# Patient Record
Sex: FEMALE | Race: WHITE | NOT HISPANIC OR LATINO | Employment: UNEMPLOYED | URBAN - METROPOLITAN AREA
[De-identification: names, ages, dates, MRNs, and addresses within clinical notes are randomized per-mention and may not be internally consistent; named-entity substitution may affect disease eponyms.]

---

## 2019-02-23 ENCOUNTER — HOSPITAL ENCOUNTER (OUTPATIENT)
Dept: URGENT CARE | Facility: CLINIC | Age: 24
Discharge: HOME | End: 2019-02-23
Attending: NURSE PRACTITIONER

## 2019-02-25 LAB — BACTERIA SPEC AEROBE CULT: NORMAL

## 2021-05-11 ENCOUNTER — HOSPITAL ENCOUNTER (OUTPATIENT)
Dept: OTHER | Facility: HOSPITAL | Age: 26
Discharge: HOME | End: 2021-05-11
Attending: NURSE PRACTITIONER

## 2021-05-14 LAB
CONV LAST MENSTURAL PERIOD: NORMAL
SPECIMEN SOURCE: NORMAL
SPECIMEN SOURCE: NORMAL
THIN PREP CVX: NORMAL

## 2021-09-13 PROBLEM — J30.9 ALLERGIC RHINITIS: Status: ACTIVE | Noted: 2021-09-13

## 2021-09-13 PROBLEM — F41.9 ANXIETY: Status: ACTIVE | Noted: 2021-09-13

## 2021-09-13 PROBLEM — N20.0 KIDNEY STONE: Status: ACTIVE | Noted: 2021-09-13

## 2021-09-13 PROBLEM — E55.9 VITAMIN D DEFICIENCY: Status: ACTIVE | Noted: 2021-09-13

## 2021-09-20 ENCOUNTER — OFFICE VISIT (OUTPATIENT)
Dept: OBSTETRICS AND GYNECOLOGY | Facility: CLINIC | Age: 26
End: 2021-09-20

## 2021-09-20 VITALS
SYSTOLIC BLOOD PRESSURE: 128 MMHG | BODY MASS INDEX: 27.52 KG/M2 | HEART RATE: 98 BPM | WEIGHT: 161.2 LBS | HEIGHT: 64 IN | DIASTOLIC BLOOD PRESSURE: 90 MMHG

## 2021-09-20 DIAGNOSIS — Z30.8 ENCOUNTER FOR OTHER CONTRACEPTIVE MANAGEMENT: Primary | ICD-10-CM

## 2021-09-20 PROBLEM — K21.9 GASTROESOPHAGEAL REFLUX DISEASE: Status: ACTIVE | Noted: 2021-09-20

## 2021-09-20 PROCEDURE — 11982 REMOVE DRUG IMPLANT DEVICE: CPT | Performed by: NURSE PRACTITIONER

## 2021-09-20 RX ORDER — OMEPRAZOLE 20 MG/1
20 CAPSULE, DELAYED RELEASE ORAL DAILY
COMMUNITY
Start: 2021-07-28

## 2021-09-20 NOTE — PROGRESS NOTES
Patient wants to have nexplanon removed, has random back pains and hasn't felt like herself since she had it inserted.  Planning to use NFP/condoms for now.     Procedure: Nexplanon removal    Procedures    Pre Dx: 1) Nexplanon removal  Post Dx: 1) Nexplanon removal   The risks, benefits, and alternatives to removal of the device have been discussed with the patient at length. All of her questions are answered. She is aware of the need for alternative means of contraception if desired. Verbal informed consent is obtained.    Able to easily palpate the device in the  Left arm. Additional imaging was not required. The device feels freely mobile and easily accessible. She was placed in the examining table in a supine position with her arm flexed at the elbow and hand under her head. The skin over this site is prepped with Betadine. 2-3 mL of 1% lidocaine with epinephrine was injected.    Downward pressure was applied on the proximal end of the implant nearest the axilla, and a 2-3 mm incision was made in a longitudinal direction of the arm at the tip of the implant closest to the elbow. The implant was then pushed gently toward the incision until the tip was visible. The fibrous capsule was opened with blunt dissection using a hemostat. The implant was grasp with a hemostat and was easily removed intact. It measured a  full 4 cm in length.    Steri-strips were placed over incision site, followed by antibiotic ointment and a pressure dressing.     Tolerated well  No apparent complications  She was advised to call or return for complications such as fever, signs of infection, increased pain, or any other concerns.    Warning signs, limitations and expectations reviewed.     Delmar Bocanegra, APRN  09/20/2021

## 2022-01-24 ENCOUNTER — LAB (OUTPATIENT)
Dept: LAB | Facility: HOSPITAL | Age: 27
End: 2022-01-24

## 2022-01-24 ENCOUNTER — TRANSCRIBE ORDERS (OUTPATIENT)
Dept: LAB | Facility: HOSPITAL | Age: 27
End: 2022-01-24

## 2022-01-24 DIAGNOSIS — Z20.822 ENCOUNTER FOR LABORATORY TESTING FOR COVID-19 VIRUS: Primary | ICD-10-CM

## 2022-01-24 DIAGNOSIS — Z20.822 ENCOUNTER FOR LABORATORY TESTING FOR COVID-19 VIRUS: ICD-10-CM

## 2022-01-24 PROCEDURE — C9803 HOPD COVID-19 SPEC COLLECT: HCPCS

## 2022-01-24 PROCEDURE — U0004 COV-19 TEST NON-CDC HGH THRU: HCPCS

## 2022-01-25 LAB — SARS-COV-2 RNA PNL SPEC NAA+PROBE: DETECTED

## 2023-11-22 ENCOUNTER — INITIAL PRENATAL (OUTPATIENT)
Dept: OBSTETRICS AND GYNECOLOGY | Facility: CLINIC | Age: 28
End: 2023-11-22
Payer: COMMERCIAL

## 2023-11-22 ENCOUNTER — TELEPHONE (OUTPATIENT)
Dept: OBSTETRICS AND GYNECOLOGY | Facility: CLINIC | Age: 28
End: 2023-11-22
Payer: COMMERCIAL

## 2023-11-22 VITALS — WEIGHT: 166 LBS | SYSTOLIC BLOOD PRESSURE: 114 MMHG | BODY MASS INDEX: 28.49 KG/M2 | DIASTOLIC BLOOD PRESSURE: 82 MMHG

## 2023-11-22 DIAGNOSIS — Z34.81 ENCOUNTER FOR SUPERVISION OF OTHER NORMAL PREGNANCY IN FIRST TRIMESTER: ICD-10-CM

## 2023-11-22 DIAGNOSIS — O09.899 H/O PRETERM DELIVERY, CURRENTLY PREGNANT: ICD-10-CM

## 2023-11-22 DIAGNOSIS — F32.A ANXIETY AND DEPRESSION: ICD-10-CM

## 2023-11-22 DIAGNOSIS — O21.9 NAUSEA AND VOMITING DURING PREGNANCY: ICD-10-CM

## 2023-11-22 DIAGNOSIS — Z34.80 SUPERVISION OF OTHER NORMAL PREGNANCY, ANTEPARTUM: Primary | ICD-10-CM

## 2023-11-22 DIAGNOSIS — F41.9 ANXIETY AND DEPRESSION: ICD-10-CM

## 2023-11-22 PROBLEM — J30.9 ALLERGIC RHINITIS: Status: RESOLVED | Noted: 2021-09-13 | Resolved: 2023-11-22

## 2023-11-22 PROBLEM — E55.9 VITAMIN D DEFICIENCY: Status: RESOLVED | Noted: 2021-09-13 | Resolved: 2023-11-22

## 2023-11-22 PROBLEM — K21.9 GASTROESOPHAGEAL REFLUX DISEASE: Status: RESOLVED | Noted: 2021-09-20 | Resolved: 2023-11-22

## 2023-11-22 PROBLEM — N20.0 KIDNEY STONE: Status: RESOLVED | Noted: 2021-09-13 | Resolved: 2023-11-22

## 2023-11-22 LAB
ABO GROUP BLD: NORMAL
AMPHET+METHAMPHET UR QL: NEGATIVE
B-HCG UR QL: POSITIVE
BARBITURATES UR QL SCN: NEGATIVE
BASOPHILS # BLD AUTO: 0.03 10*3/MM3 (ref 0–0.2)
BASOPHILS NFR BLD AUTO: 0.3 % (ref 0–1.5)
BENZODIAZ UR QL SCN: NEGATIVE
BLD GP AB SCN SERPL QL: NEGATIVE
C TRACH RRNA CVX QL NAA+PROBE: NOT DETECTED
CANNABINOIDS SERPL QL: NEGATIVE
COCAINE UR QL: NEGATIVE
DEPRECATED RDW RBC AUTO: 36.5 FL (ref 37–54)
EOSINOPHIL # BLD AUTO: 0.21 10*3/MM3 (ref 0–0.4)
EOSINOPHIL NFR BLD AUTO: 1.9 % (ref 0.3–6.2)
ERYTHROCYTE [DISTWIDTH] IN BLOOD BY AUTOMATED COUNT: 11.6 % (ref 12.3–15.4)
EXPIRATION DATE: ABNORMAL
FENTANYL UR-MCNC: NEGATIVE NG/ML
GLUCOSE UR STRIP-MCNC: NEGATIVE MG/DL
HBA1C MFR BLD: 5.2 % (ref 4.8–5.6)
HBV SURFACE AG SERPL QL IA: NORMAL
HCT VFR BLD AUTO: 40.7 % (ref 34–46.6)
HCV AB SER DONR QL: NORMAL
HGB BLD-MCNC: 13.9 G/DL (ref 12–15.9)
HIV1+2 AB SER QL: NORMAL
IMM GRANULOCYTES # BLD AUTO: 0.03 10*3/MM3 (ref 0–0.05)
IMM GRANULOCYTES NFR BLD AUTO: 0.3 % (ref 0–0.5)
INTERNAL NEGATIVE CONTROL: ABNORMAL
INTERNAL POSITIVE CONTROL: ABNORMAL
LYMPHOCYTES # BLD AUTO: 2.28 10*3/MM3 (ref 0.7–3.1)
LYMPHOCYTES NFR BLD AUTO: 20.5 % (ref 19.6–45.3)
Lab: ABNORMAL
MCH RBC QN AUTO: 29.8 PG (ref 26.6–33)
MCHC RBC AUTO-ENTMCNC: 34.2 G/DL (ref 31.5–35.7)
MCV RBC AUTO: 87.2 FL (ref 79–97)
METHADONE UR QL SCN: NEGATIVE
MONOCYTES # BLD AUTO: 0.77 10*3/MM3 (ref 0.1–0.9)
MONOCYTES NFR BLD AUTO: 6.9 % (ref 5–12)
N GONORRHOEA RRNA SPEC QL NAA+PROBE: NOT DETECTED
NEUTROPHILS NFR BLD AUTO: 7.8 10*3/MM3 (ref 1.7–7)
NEUTROPHILS NFR BLD AUTO: 70.1 % (ref 42.7–76)
NRBC BLD AUTO-RTO: 0 /100 WBC (ref 0–0.2)
OPIATES UR QL: NEGATIVE
OXYCODONE UR QL SCN: NEGATIVE
PLATELET # BLD AUTO: 279 10*3/MM3 (ref 140–450)
PMV BLD AUTO: 11.1 FL (ref 6–12)
PROT UR STRIP-MCNC: ABNORMAL MG/DL
RBC # BLD AUTO: 4.67 10*6/MM3 (ref 3.77–5.28)
RH BLD: POSITIVE
T PALLIDUM IGG SER QL: NORMAL
WBC NRBC COR # BLD AUTO: 11.12 10*3/MM3 (ref 3.4–10.8)

## 2023-11-22 PROCEDURE — 86901 BLOOD TYPING SEROLOGIC RH(D): CPT | Performed by: OBSTETRICS & GYNECOLOGY

## 2023-11-22 PROCEDURE — G0432 EIA HIV-1/HIV-2 SCREEN: HCPCS | Performed by: OBSTETRICS & GYNECOLOGY

## 2023-11-22 PROCEDURE — 83036 HEMOGLOBIN GLYCOSYLATED A1C: CPT | Performed by: OBSTETRICS & GYNECOLOGY

## 2023-11-22 PROCEDURE — 87086 URINE CULTURE/COLONY COUNT: CPT | Performed by: OBSTETRICS & GYNECOLOGY

## 2023-11-22 PROCEDURE — 87591 N.GONORRHOEAE DNA AMP PROB: CPT | Performed by: OBSTETRICS & GYNECOLOGY

## 2023-11-22 PROCEDURE — 86780 TREPONEMA PALLIDUM: CPT | Performed by: OBSTETRICS & GYNECOLOGY

## 2023-11-22 PROCEDURE — 86803 HEPATITIS C AB TEST: CPT | Performed by: OBSTETRICS & GYNECOLOGY

## 2023-11-22 PROCEDURE — 80307 DRUG TEST PRSMV CHEM ANLYZR: CPT | Performed by: OBSTETRICS & GYNECOLOGY

## 2023-11-22 PROCEDURE — 85025 COMPLETE CBC W/AUTO DIFF WBC: CPT | Performed by: OBSTETRICS & GYNECOLOGY

## 2023-11-22 PROCEDURE — 87340 HEPATITIS B SURFACE AG IA: CPT | Performed by: OBSTETRICS & GYNECOLOGY

## 2023-11-22 PROCEDURE — 86850 RBC ANTIBODY SCREEN: CPT | Performed by: OBSTETRICS & GYNECOLOGY

## 2023-11-22 PROCEDURE — 87491 CHLMYD TRACH DNA AMP PROBE: CPT | Performed by: OBSTETRICS & GYNECOLOGY

## 2023-11-22 PROCEDURE — 86900 BLOOD TYPING SEROLOGIC ABO: CPT | Performed by: OBSTETRICS & GYNECOLOGY

## 2023-11-22 RX ORDER — PRENATAL VIT NO.126/IRON/FOLIC 28MG-0.8MG
1 TABLET ORAL DAILY
COMMUNITY

## 2023-11-22 RX ORDER — ESCITALOPRAM OXALATE 20 MG/1
20 TABLET ORAL DAILY
Qty: 30 TABLET | Refills: 6 | Status: SHIPPED | OUTPATIENT
Start: 2023-11-22

## 2023-11-22 RX ORDER — DIPHENHYDRAMINE HYDROCHLORIDE 25 MG/1
25 CAPSULE ORAL DAILY
Qty: 30 TABLET | Refills: 0 | Status: SHIPPED | OUTPATIENT
Start: 2023-11-22

## 2023-11-22 NOTE — ASSESSMENT & PLAN NOTE
G4-delivered at 35 weeks-spontaneous labor  Cervical length 16-24 weeks Q 2 weeks  Vaginal progesterone-undecided

## 2023-11-22 NOTE — PATIENT INSTRUCTIONS
Venipuncture Blood Specimen Collection  Venipuncture performed in left arm by Carmela Abdul with good hemostasis. Patient tolerated the procedure well without complications.   11/22/23   Carmela Abdul

## 2023-11-22 NOTE — ASSESSMENT & PLAN NOTE
BENJIE finalize:Estimated Date of Delivery: 24 based on LMP, confirmed with BSUS CRL at 9w3d      Genetic testing (NIPS-Quad)/CF/AFP:  ordered    COVID: Recommended  Flu: Recommended   Tdap:Script 27-36 weeks   RSV: Script 32-36 weeks     Rhogam:     Sterilization:    Anatomy US:ordered  FU US:    EPDS: 3 at new OB visit, 0 on #10  GTT:     PROBLEM LIST/PLAN:   Hx of  labor-35 weeks G4, recommend vaginal progesterone nightly at 16 weeks-36 weeks, cervical length 16-24 weeks   Anxiety and depression-Zoloft 25mg daily

## 2023-11-22 NOTE — PROGRESS NOTES
OBSTETRIC HISTORY AND PHYSICAL     Subjective:  Lillian Kebede is a 28 y.o.  at 10w2d  here for her new OB visit. Patient pregnancy is dated by LMP, confirmed BENJIE with BSUS CRL of 9w3d. Patient's pregnancy is complicated by hx of  delivery G4, anxiety and depression, multiparity.   She is taking her prenatal vitamins.Reports no loss of fluid or vaginal bleeding.No hx of genetic, bleeding, endocrine, chromosome disorder in both patient and partner. No history of multiple gestations, congenital anomalies or mental retardation.    FOB involvement: yes, Same Fob  Pediatrician: yes  Breast/Bottle: breast   Epidural: yes  Discussed adequate water intake, food guidelines/weight gain, limit caffiene to less than 200mg daily.   Discussed food, activities to avoid. Discussed seatbelt safety.   Reviewed safe meds in pregnancy handout.  Taking PNV: yes  Smoking cessation needed: no    Reviewed and updated:  OBHx, GYNHx (STDs), PMHx, Medications, Allergies, PSHx, Social Hx, Preventative Hx (PAP), Hx of abuse/safe environment, Vaccine Hx including hx of chickenpox or vaccine, Genetic Hx (pt, FOB, both families).        OB History    Para Term  AB Living   5 4 3 1   4   SAB IAB Ectopic Molar Multiple Live Births             4      # Outcome Date GA Lbr Teodoro/2nd Weight Sex Delivery Anes PTL Lv   5 Current            4  20 35w0d  2637 g (5 lb 13 oz) M Vag-Spont EPI Y PRESTON   3 Term 17 40w0d  2948 g (6 lb 8 oz) M Vag-Spont EPI N PRESTON   2 Term 03/10/16 40w0d  3657 g (8 lb 1 oz) M Vag-Spont EPI N PRESTON   1 Term 13 40w0d  2977 g (6 lb 9 oz) M Vag-Spont EPI N PRESTON      Obstetric Comments   MENARCHE 11 YRS OLD REGULAR CYCLES, LASTING 5 DAYS     Past Medical History:   Diagnosis Date    Acute vaginitis     Allergy     CEPHALEXIN   MODERATE ITCHNESS    Anxiety     Depression     Heartburn     Kidney stone     Nexplanon in place 2020    Oligohydramnios, unspecified trimester, fetus 1      Other abnormal findings in urine     Threatened      Vitamin D deficiency, unspecified      Past Surgical History:   Procedure Laterality Date    CYSTOSCOPY BLADDER STONE LITHOTRIPSY       Family History   Problem Relation Age of Onset    Breast cancer Mother     Thyroid disease Mother     Breast cancer Maternal Grandmother     Diabetes Other         mat aunt mat uncle    Diabetes Other         NOT SPECIFIED    Uterine cancer Neg Hx     Ovarian cancer Neg Hx     Colon cancer Neg Hx      Allergies   Allergen Reactions    Cephalexin Itching     Social History     Socioeconomic History    Marital status:    Tobacco Use    Smoking status: Never    Smokeless tobacco: Never   Vaping Use    Vaping Use: Never used   Substance and Sexual Activity    Alcohol use: Not Currently    Drug use: Never    Sexual activity: Yes     Partners: Male     Birth control/protection: None           ROS:  General ROS: negative for - chills or fatigue  Respiratory ROS: negative for - cough or hemoptysis  Cardiovascular ROS: negative for - chest pain or dyspnea on exertion  Gastrointestinal ROS: negative for - abdominal pain or appetite loss  Musculoskeletal ROS: negative for - gait disturbance or joint pain  Neurological ROS: negative for - behavioral changes or bowel and bladder control changes  Dermatological ROS: negative for rashes or lesions     Objective:  Physical Exam:   Vitals:    23 0930   BP: 114/82       General appearance - alert, well appearing, and in no distress  Mental status - alert, oriented to person, place, and time  Neck- Supple.  No nodularity or enlargement.  Heart- Regular rate and rhythm without murmur, gallop or rub.  Lungs- Clear to auscultation bilaterally, negative W/R/R  Breasts- Deferred to annual  Abdomen- Soft, Gravid uterus, non-tender  Extremeties: Normal ROM, Negative swelling or cyanosis  Neurological: Gait normal, Negative tingling or loss of sensation     FHTS: + BSUS      Counseling:   Nutrition discussed, calories, activity/exercise in pregnancy  Discussed dietary restrictions/safety food preparation in pregnancy  Reviewed what to expect prenatal visits, office providers (female and male) and covering [facility] Hospitalists/Dr. Maier  Appropriate trimester precautions provided, N/V, vag bleeding, cramping  VACCINE importance in pregnancy discussed.  Maternal and fetal risk of not being vaccinated reviewed NLT increased risk maternal/fetal severity of illness/death, PTD, CS, hemorrhage, HTN, possible IUFD.  Significant maternal and fetal/infant benefit w vaccination.  FDA approval and ACOG/SMFM/CDC strong recommendation in pregnancy.  Questions answered.   Questions answered  ANXIETY/DEPRESSION- We discussed treatment options R/B/A/SE/E expectant, THERAPY, SSRI, vs SSRI + Therapy.  Non medical options usually recommended first.  MARTI reviewed.  Ideally weaning in third tri if possible. Some studies indicate child w increased risk Dep/Anx w SSRI use in preg.  Black box warning.  Recommend avoid abrupt discontinuation.  Discussed healthy weight gain.  Also discussed increased water intake, 200mg of caffeine daily, exercise and healthy eating habits. *   Encouraged patient to consider breastfeeding. Discussed that it is recommended by the CDC and WHO that women exclusively breastfeed for the first 6 months and continue to breastfeed up to one year. Discussed the health benefits of breastfeeding, including increased immune systems in infants, reduced risk of food allergies, and reduced risk of chronic disease as an adult. Maternal benefits include more PP weight loss, reduced risk of PP depression, breast/ovarian cancer risk reduced.     ASSESSMENT/PLAN:   Diagnoses and all orders for this visit:    1. Supervision of other normal pregnancy, antepartum (Primary)  Assessment & Plan:  BENJIE finalize:Estimated Date of Delivery: 6/17/24 based on LMP, confirmed with BSUS CRL at 9w3d      Genetic  testing (NIPS-Quad)/CF/AFP:  ordered    COVID: Recommended  Flu: Recommended   Tdap:Script 27-36 weeks   RSV: Script 32-36 weeks     Rhogam:     Sterilization:    Anatomy US:ordered  FU US:    EPDS: 3 at new OB visit, 0 on #10  GTT:     PROBLEM LIST/PLAN:   Hx of  labor-35 weeks G4, recommend vaginal progesterone nightly at 16 weeks-36 weeks, cervical length 16-24 weeks   Anxiety and depression-Zoloft 25mg daily     Orders:  -     Chlamydia trachomatis, Neisseria gonorrhoeae, PCR - Urine, Urine, Random Void  -     Hemoglobin A1c  -     Hemoglobinopathy Fractionation [place]  -     OB Panel With HIV  -     POC Urinalysis Dipstick  -     POC Pregnancy, Urine  -     Urine Culture - Urine, Urine, Clean Catch  -     Urine Drug Screen - Urine, Clean Catch  -     US Ob 14 + Weeks Single or First Gestation; Future    2. Encounter for supervision of other normal pregnancy in first trimester  -     Inheritest (R) CF/SMA Panel - Blood,  -     [id number] PLUS Core+SCA+ESS - Blood,    3. H/O  delivery, currently pregnant  Assessment & Plan:  G4-delivered at 35 weeks-spontaneous labor  Cervical length 16-24 weeks Q 2 weeks  Vaginal progesterone-undecided    Orders:  -     US Ob Transvaginal; Standing    4. Nausea and vomiting during pregnancy  -     vitamin B-6 (PYRIDOXINE) 25 MG tablet; Take 1 tablet by mouth Daily.  Dispense: 30 tablet; Refill: 0  -     doxylamine (UNISOM) 25 MG tablet; Take 1 tablet by mouth Daily As Needed for Sleep or Nausea (or anxiety). May take and extra 1/2 tablet PRN persistent nausea or anxiety  Dispense: 30 tablet; Refill: 0    5. Anxiety and depression  Assessment & Plan:  Stable, Lexapro 20mg     Orders:  -     escitalopram (LEXAPRO) 20 MG tablet; Take 1 tablet by mouth Daily.  Dispense: 30 tablet; Refill: 6        Problems Addressed this Visit          Gravid and     H/O  delivery, currently pregnant     G4-delivered at 35 weeks-spontaneous labor  Cervical length 16-24  weeks Q 2 weeks  Vaginal progesterone-undecided         Relevant Orders    US Ob Transvaginal    Supervision of other normal pregnancy, antepartum - Primary     BENJIE finalize:Estimated Date of Delivery: 24 based on LMP, confirmed with BSUS CRL at 9w3d      Genetic testing (NIPS-Quad)/CF/AFP:  ordered    COVID: Recommended  Flu: Recommended   Tdap:Script 27-36 weeks   RSV: Script 32-36 weeks     Rhogam:     Sterilization:    Anatomy US:ordered  FU US:    EPDS: 3 at new OB visit, 0 on #10  GTT:     PROBLEM LIST/PLAN:   Hx of  labor-35 weeks G4, recommend vaginal progesterone nightly at 16 weeks-36 weeks, cervical length 16-24 weeks   Anxiety and depression-Zoloft 25mg daily          Relevant Orders    Chlamydia trachomatis, Neisseria gonorrhoeae, PCR - Urine, Urine, Random Void    Hemoglobin A1c    Hemoglobinopathy Fractionation [place]    OB Panel With HIV    POC Urinalysis Dipstick (Completed)    POC Pregnancy, Urine (Completed)    Urine Culture - Urine, Urine, Clean Catch    Urine Drug Screen - Urine, Clean Catch    US Ob 14 + Weeks Single or First Gestation       Mental Health    Anxiety and depression     Stable, Lexapro 20mg          Relevant Medications    doxylamine (UNISOM) 25 MG tablet    escitalopram (LEXAPRO) 20 MG tablet     Other Visit Diagnoses       Encounter for supervision of other normal pregnancy in first trimester        Relevant Orders    Inheritest (R) CF/SMA Panel - Blood,    [id number] PLUS Core+SCA+ESS - Blood,    Nausea and vomiting during pregnancy        Relevant Medications    vitamin B-6 (PYRIDOXINE) 25 MG tablet    doxylamine (UNISOM) 25 MG tablet          Diagnoses         Codes Comments    Supervision of other normal pregnancy, antepartum    -  Primary ICD-10-CM: Z34.80  ICD-9-CM: V22.1     Encounter for supervision of other normal pregnancy in first trimester     ICD-10-CM: Z34.81  ICD-9-CM: V22.1     H/O  delivery, currently pregnant     ICD-10-CM:  O09.899  ICD-9-CM: V23.89     Nausea and vomiting during pregnancy     ICD-10-CM: O21.9  ICD-9-CM: 643.90     Anxiety and depression     ICD-10-CM: F41.9, F32.A  ICD-9-CM: 300.00, 311                     Return in about 4 weeks (around 12/20/2023) for Routine OB visit.    We have gone over the expected prenatal care to include the timing and content of visits including the anatomy ultrasound.  We discussed the content of the anatomy ultrasound and its limitations (the fact that ultrasound in general may not see up to 40% of abnormalities).  I informed her how to contact the office and/or on call person in the event of any problems and encouraged her to do so when she feels it is necessary.  We then spent time answering her questions which she indicated were answered to her satisfaction.    Lillian Mcnulty DO  11/22/2023 10:04 EST

## 2023-11-23 LAB — RUBV IGG SERPL IA-ACNC: 1.93 INDEX

## 2023-11-24 LAB
BACTERIA SPEC AEROBE CULT: NO GROWTH
HGB A MFR BLD ELPH: 97.4 % (ref 96.4–98.8)
HGB A2 MFR BLD ELPH: 2.6 % (ref 1.8–3.2)
HGB F MFR BLD ELPH: 0 % (ref 0–2)
HGB FRACT BLD-IMP: NORMAL
HGB S MFR BLD ELPH: 0 %

## 2023-11-27 ENCOUNTER — PATIENT ROUNDING (BHMG ONLY) (OUTPATIENT)
Dept: OBSTETRICS AND GYNECOLOGY | Facility: CLINIC | Age: 28
End: 2023-11-27
Payer: COMMERCIAL

## 2023-11-27 NOTE — PROGRESS NOTES
A My-Chart message has been sent to the patient for [place] [facility] [place]

## 2023-12-04 LAB
CITATION REF LAB TEST: NORMAL
ETHNIC BACKGROUND STATED: NORMAL
GENE DIS ANL CARRIER INTERP-IMP: NORMAL
GENE STUDIED ID: NORMAL
LAB DIRECTOR NAME PROVIDER: NORMAL
Lab: NORMAL
MOL DX INTERP BLD/T QL: NORMAL
REASON FOR REFERRAL (NARRATIVE): NORMAL
RECOMMENDATION PATIENT DOC-IMP: NORMAL
REF LAB TEST METHOD: NORMAL
SERVICE CMNT-IMP: NORMAL
SPECIMEN SOURCE: NORMAL

## 2023-12-19 NOTE — PROGRESS NOTES
Routine Prenatal Visit     Subjective  Lillian Kebede is a 28 y.o.  at 14w2d here for her routine OB visit.   She is taking her prenatal vitamins.Reports no loss of fluid or vaginal bleeding. Patient doing well but does feel very anxious. EPDS today 13, denies any thoughts of harming herself or others. Was started on lexapro 20mg but has not started, she was counseled that it can worsen at first when starting to take it but if she has any of these thoughts of harming herself or others to go to the ED. She is unsure on taking the vaginal progesterone. She was counseled that I can prescribe it and if she does desire to take it she will start at 16 weeks with her cervical lengths.     Pregnancy is complicated by:    Patient Active Problem List   Diagnosis    Anxiety and depression    Supervision of other normal pregnancy, antepartum    H/O  delivery, currently pregnant         OB History    Para Term  AB Living   5 4 3 1   4   SAB IAB Ectopic Molar Multiple Live Births             4      # Outcome Date GA Lbr Teodoro/2nd Weight Sex Delivery Anes PTL Lv   5 Current            4  20 35w0d  2637 g (5 lb 13 oz) M Vag-Spont EPI Y PRESTON   3 Term 17 40w0d  2948 g (6 lb 8 oz) M Vag-Spont EPI N PRESTON   2 Term 03/10/16 40w0d  3657 g (8 lb 1 oz) M Vag-Spont EPI N PRESTON   1 Term 13 40w0d  2977 g (6 lb 9 oz) M Vag-Spont EPI N PRESTON      Obstetric Comments   MENARCHE 11 YRS OLD REGULAR CYCLES, LASTING 5 DAYS           ROS:   General ROS: negative for - chills or fatigue  Respiratory ROS: negative for - cough or hemoptysis  Cardiovascular ROS: negative for - chest pain or dyspnea on exertion  Genito-Urinary ROS: negative for  change in urinary stream, vaginal discharge   Musculoskeletal ROS: negative for - gait disturbance or joint pain  Dermatological ROS: negative for acne,  dry skin or itching    Objective  Physical Exam:   Vitals:    23 1031   BP: 117/78       FHT: 110-160  BPM via BSUS     General appearance - alert, well appearing, and in no distress  Mental status - alert, oriented to person, place, and time  Abdomen- Soft, Gravid uterus, non-tender to palpation  Musculoskeletal: negative for - gait disturbance or joint pain  Extremeties: negative swelling or cyanosis   Dermatological: negative rashes or skin lesions       Assessment/Plan:   Diagnoses and all orders for this visit:    1. Supervision of other normal pregnancy, antepartum (Primary)  Assessment & Plan:  PROBLEM LIST/PLAN:   Hx of  labor-35 weeks G4, recommend vaginal progesterone nightly at 16 weeks-36 weeks, cervical length 16-24 weeks   Anxiety and depression-Zoloft 25mg daily     Orders:  -     POC Urinalysis Dipstick    2. H/O  delivery, currently pregnant  -     Progesterone (Prometrium) 100 MG capsule; Insert 1 capsule into the vagina Every Night.  Dispense: 30 capsule; Refill: 7    3. Anxiety and depression          Counseling:   Second trimester precautions  Round Ligament Pain:  The uterus has several ligaments which provide support and keep the uterus in place. As the  uterus grows these ligaments are pulled and stretched which often causes sharp stabbing like pain in the inguinal area.   You may find a pregnancy support band helpful. Changing positions may also help. Yoga is a great way to cope with round ligament and low back pain in pregnancy.    Massage may also help with low back pain   Things to Consider at this Point in your Pregnancy:  Some women experience swelling in their feet during pregnancy. Compression stockings may help  Drink plenty of water and stay active   Make sure you are eating frequent small meals, nuts are a wonderful snack to keep with you            Return in about 4 weeks (around 2024) for Routine OB visit.      We have gone over prenatal care to include the timing and content of visits. I informed her how to contact the office and/or on call person in the event  of any problems and encouraged her to do so when she feels it is necessary.  We then spent time answering her questions which she indicated were answered to her satisfaction.    Lillian Mcnulty DO  12/20/2023 11:02 EST

## 2023-12-19 NOTE — ASSESSMENT & PLAN NOTE
PROBLEM LIST/PLAN:   Hx of  labor-35 weeks G4, recommend vaginal progesterone nightly at 16 weeks-36 weeks, cervical length 16-24 weeks   Anxiety and depression-Zoloft 25mg daily

## 2023-12-20 ENCOUNTER — ROUTINE PRENATAL (OUTPATIENT)
Dept: OBSTETRICS AND GYNECOLOGY | Facility: CLINIC | Age: 28
End: 2023-12-20
Payer: COMMERCIAL

## 2023-12-20 VITALS — SYSTOLIC BLOOD PRESSURE: 117 MMHG | BODY MASS INDEX: 28.15 KG/M2 | WEIGHT: 164 LBS | DIASTOLIC BLOOD PRESSURE: 78 MMHG

## 2023-12-20 DIAGNOSIS — Z34.80 SUPERVISION OF OTHER NORMAL PREGNANCY, ANTEPARTUM: Primary | ICD-10-CM

## 2023-12-20 DIAGNOSIS — F41.9 ANXIETY AND DEPRESSION: ICD-10-CM

## 2023-12-20 DIAGNOSIS — F32.A ANXIETY AND DEPRESSION: ICD-10-CM

## 2023-12-20 DIAGNOSIS — O09.899 H/O PRETERM DELIVERY, CURRENTLY PREGNANT: ICD-10-CM

## 2023-12-20 LAB
GLUCOSE UR STRIP-MCNC: NEGATIVE MG/DL
PROT UR STRIP-MCNC: NEGATIVE MG/DL

## 2023-12-20 RX ORDER — PROGESTERONE 100 MG/1
100 CAPSULE ORAL NIGHTLY
Qty: 30 CAPSULE | Refills: 7 | Status: SHIPPED | OUTPATIENT
Start: 2023-12-20

## 2024-01-04 PROBLEM — O44.40 LOW-LYING PLACENTA: Status: ACTIVE | Noted: 2024-01-04

## 2024-01-22 NOTE — PROGRESS NOTES
OB FOLLOW UP      Chief Complaint   Patient presents with    Routine Prenatal Visit     Subjective:   Feels out of breath for a couple weeks is assoc w upper abd pressure.  Comes mostly after eating after lunch and dinner.  Lasts 1hr.  No CP.  No hx of asthma.  Is constipated, no melana or hematochezia.  Goes away w pressing on it or lying on side.  Is having acid reflux, not sure if it is associated w it    Objective:  /77   Wt 73.1 kg (161 lb 3.2 oz)   LMP 2023   BMI 27.67 kg/m²  -0.816 kg (-1 lb 12.8 oz) Facility age limit for growth %prema is 20 years.  See OB flow sheet for fundal height (not performed if US day of OV), FHT, edema, cvx exam if performed, and Upro/Uglu  Chaperone present during pelvic exam if performed.  CV- RRR  Resp-CTA  Abd- Soft, ND, NT, fundus NT      Assessment and Plan:  19w1d     Diagnoses and all orders for this visit:    1. Supervision of other normal pregnancy, antepartum (Primary)  Overview:  BENJIE finalized:  24 based on LMP, confirmed with BSUS CRL at 9w3d    cfDNA-neg, XX, CF/SMA-neg, declines AFP 2024     COVID: Recommended  Flu: Recommended   Tdap:    FU TPA 29-32weeks:    Sterilization:    Cvx length:   1/3/24 4.3cm  24  4.7cm  Anatomy US: 24 [facility]  FU US:    EPDS: 3 at new OB visit, 0 on #10  GTT:     PROBLEM LIST/PLAN:   Hx of PTD 35 weeks G4  recommended vaginal progesterone nightly at 16 weeks-36 weeks- declines 2024   cervical length 16-24 weeks   Anxiety and depression- Off Lexapro.  Stable  Low lying placenta-16 week US-Pelvic rest, not seen 2024     Orders:  -     POC Urinalysis Dipstick    2. Gastroesophageal reflux disease without esophagitis  -     famotidine (Pepcid) 20 MG tablet; Take 1 tablet by mouth 2 (Two) Times a Day As Needed for Heartburn.  Dispense: 60 tablet; Refill: 1    3. H/O  delivery, currently pregnant  Overview:  G4-delivered at 35 weeks-spontaneous labor, cxns after sex  Cervical length 16-24 weeks Q  2 weeks  Vaginal progesterone-undecided.  1/23/2024 dw pt R/B, risk of PTD w prior and risk another PTD, may be earlier than last        Counseling:    Second trimester precautions  GERD in pregnancy discussed.  Recommend smaller meals, avoid lying down at least 2hrs after meals, and avoid foods that trigger it (commonly highly seasoned foods, pizza, italian, mexican etc).  OTC Tums safe.  If using them regularly recommend other medication options that can be prescribed.   Discussed suspect abd pressure/shortness of air assoc w full stomach, possible GERD.  Rec FU ER for any CP or SOA that doesn't resolve.    Reassuring pregnancy progress. Questions answered  Continue PNV.  Importance of healthy eating, obtaining sufficient sleep, and staying active unless hypertensive- activity modified as directed.    Return in about 2 weeks (around 2/6/2024) for FU OB w anatomy US and cvx length.  Cvx lengths 2weeks later .            Rosa Grady, DO  01/23/2024    [facility]  [facility] [place] [address]  Dept: [phone]  Dept Fax: [phone]  Loc: [phone]  Loc Fax: [phone]

## 2024-01-23 ENCOUNTER — ROUTINE PRENATAL (OUTPATIENT)
Dept: OBSTETRICS AND GYNECOLOGY | Facility: CLINIC | Age: 29
End: 2024-01-23
Payer: COMMERCIAL

## 2024-01-23 VITALS — SYSTOLIC BLOOD PRESSURE: 123 MMHG | DIASTOLIC BLOOD PRESSURE: 77 MMHG | BODY MASS INDEX: 27.67 KG/M2 | WEIGHT: 161.2 LBS

## 2024-01-23 DIAGNOSIS — Z34.80 SUPERVISION OF OTHER NORMAL PREGNANCY, ANTEPARTUM: Primary | ICD-10-CM

## 2024-01-23 DIAGNOSIS — K21.9 GASTROESOPHAGEAL REFLUX DISEASE WITHOUT ESOPHAGITIS: ICD-10-CM

## 2024-01-23 DIAGNOSIS — O09.899 H/O PRETERM DELIVERY, CURRENTLY PREGNANT: ICD-10-CM

## 2024-01-23 LAB
GLUCOSE UR STRIP-MCNC: NEGATIVE MG/DL
PROT UR STRIP-MCNC: NEGATIVE MG/DL

## 2024-01-23 PROCEDURE — 0502F SUBSEQUENT PRENATAL CARE: CPT | Performed by: OBSTETRICS & GYNECOLOGY

## 2024-01-23 RX ORDER — FAMOTIDINE 20 MG/1
20 TABLET, FILM COATED ORAL 2 TIMES DAILY PRN
Qty: 60 TABLET | Refills: 1 | Status: SHIPPED | OUTPATIENT
Start: 2024-01-23

## 2024-02-06 NOTE — PROGRESS NOTES
OB FOLLOW UP      Chief Complaint   Patient presents with    Routine Prenatal Visit     Subjective:   Pelvic girdle pain x1 week, had w last preg  Fatigue, mom w low thyroid, hx of vit D def    Objective:  /73   Wt 75.4 kg (166 lb 3.2 oz)   LMP 09/11/2023   BMI 28.53 kg/m²  1.452 kg (3 lb 3.2 oz) Facility age limit for growth %prema is 20 years.  See OB flow sheet for fundal height (not performed if US day of OV), FHT, edema, cvx exam if performed, and Upro/Uglu  Chaperone present during pelvic exam if performed.      Assessment and Plan:  21w4d     Diagnoses and all orders for this visit:    1. Supervision of other normal pregnancy, antepartum (Primary)  Overview:  BENJIE finalized:  6/17/24 based on LMP, confirmed with BSUS CRL at 9w3d    cfDNA-neg, XX, CF/SMA-neg, declined AFP     COVID: Recommended  Flu: Recommended   Tdap:    FU TPA 29-32weeks:    Sterilization ?:    Cvx length:   1/3/24 4.3cm  1/23/24  4.7cm  2/8/24 4.25cm  Anatomy US: 2/8/24 [facility] consistent w dates, posterior placenta, breech, wnl completed  FU US:    EPDS: 3 at new OB visit, 0 on #10  GTT:     PROBLEM LIST/PLAN:   Hx of PTD 35 weeks G4  recommended vaginal progesterone nightly at 16 weeks-36 weeks- declined  cervical length 16-24 weeks - wnl at 21+2, no more needed  Anxiety and depression- Off Lexapro.  2/9/2024 feels more anxious, randomly feels shaky feeling anxious, Rx doxylamine, reviewed off label use    Low lying placenta-16 week US-Pelvic rest, resolved    Orders:  -     POC Urinalysis Dipstick    2. Pregnancy related fatigue, antepartum  -     TSH  -     T4, Free  -     cholecalciferol 25 MCG (1000 UT) tablet; Take 1 tablet by mouth Daily.  Dispense: 90 tablet; Refill: 1    3. Anxiety and depression  -     doxylamine (UNISOM) 25 MG tablet; Take 1 tablet by mouth At Night As Needed for Sleep or Nausea (or anxiety). May take and extra 1/2 tablet PRN persistent nausea or anxiety  Dispense: 30 tablet; Refill: 1      Counseling:     Second trimester precautions  DIASTASIS PUBIS discussed.  Recommend conservative measures, using passive ROM, assistance w hip flexion using upper arms, pregnancy support belt low on hips.  FATIGUE in pregnancy reviewed.  I recommend eating small frequent meals high in PRO and CHO, staying hydrated and avoiding caffeine.  Good sleep habits and regular exercise is helpful.  OTC Vit D. If fatigue persists, option of checking TFTs.  VACCINE importance in pregnancy discussed.  Maternal and fetal risk of not being vaccinated reviewed NLT increased risk maternal/fetal severity of illness/death, PTD, CS, hemorrhage, HTN, possible IUFD.  Significant maternal and fetal/infant benefit w vaccination.  FDA approval and ACOG/SMFM/CDC strong recommendation in pregnancy.  Questions answered.       Reassuring pregnancy progress. Questions answered  Continue PNV.  Importance of healthy eating, obtaining sufficient sleep, and staying active unless hypertensive- activity modified as directed.    Return in about 4 weeks (around 3/8/2024) for FU OB w glucola, then OV 3weeks later.            Rosa Grady, DO  02/09/2024    [facility]  [address] [place] [address]  Dept: [phone]  Dept Fax: [phone]  Loc: [phone]  Loc Fax: [phone]

## 2024-02-09 ENCOUNTER — ROUTINE PRENATAL (OUTPATIENT)
Dept: OBSTETRICS AND GYNECOLOGY | Facility: CLINIC | Age: 29
End: 2024-02-09
Payer: COMMERCIAL

## 2024-02-09 VITALS — SYSTOLIC BLOOD PRESSURE: 111 MMHG | DIASTOLIC BLOOD PRESSURE: 73 MMHG | WEIGHT: 166.2 LBS | BODY MASS INDEX: 28.53 KG/M2

## 2024-02-09 DIAGNOSIS — F41.9 ANXIETY AND DEPRESSION: ICD-10-CM

## 2024-02-09 DIAGNOSIS — O26.819 PREGNANCY RELATED FATIGUE, ANTEPARTUM: ICD-10-CM

## 2024-02-09 DIAGNOSIS — Z34.80 SUPERVISION OF OTHER NORMAL PREGNANCY, ANTEPARTUM: Primary | ICD-10-CM

## 2024-02-09 DIAGNOSIS — F32.A ANXIETY AND DEPRESSION: ICD-10-CM

## 2024-02-09 LAB
GLUCOSE UR STRIP-MCNC: NEGATIVE MG/DL
PROT UR STRIP-MCNC: NEGATIVE MG/DL
T4 FREE SERPL-MCNC: 0.86 NG/DL (ref 0.93–1.7)
TSH SERPL DL<=0.05 MIU/L-ACNC: 1.58 UIU/ML (ref 0.27–4.2)

## 2024-02-09 PROCEDURE — 84443 ASSAY THYROID STIM HORMONE: CPT | Performed by: OBSTETRICS & GYNECOLOGY

## 2024-02-09 PROCEDURE — 84439 ASSAY OF FREE THYROXINE: CPT | Performed by: OBSTETRICS & GYNECOLOGY

## 2024-02-09 RX ORDER — MELATONIN
1000 DAILY
Qty: 90 TABLET | Refills: 1 | Status: SHIPPED | OUTPATIENT
Start: 2024-02-09

## 2024-02-09 NOTE — PATIENT INSTRUCTIONS
Venipuncture Blood Specimen Collection  Venipuncture performed in left arm by Anabel Kitchen with good hemostasis. Patient tolerated the procedure well without complications.   02/09/24   Anabel Kitchen

## 2024-02-10 DIAGNOSIS — O26.819 PREGNANCY RELATED FATIGUE, ANTEPARTUM: Primary | ICD-10-CM

## 2024-02-10 PROBLEM — R79.89 ABNORMAL SERUM THYROXINE (T4) LEVEL: Status: ACTIVE | Noted: 2024-02-10

## 2024-02-10 LAB — T4 SERPL-MCNC: 10.9 MCG/DL (ref 4.5–11.7)

## 2024-02-28 NOTE — PROGRESS NOTES
OB FOLLOW UP      Chief Complaint   Patient presents with    Routine Prenatal Visit     Subjective:   No complaints    Objective:  /73   Wt 76.7 kg (169 lb 3.2 oz)   LMP 09/11/2023   BMI 29.04 kg/m²  2.812 kg (6 lb 3.2 oz) Facility age limit for growth %prema is 20 years.  See OB flow sheet for fundal height (not performed if US day of OV), FHT, edema, cvx exam if performed, and Upro/Uglu  Chaperone present during pelvic exam if performed.      Assessment and Plan:  25w1d     Diagnoses and all orders for this visit:    1. Supervision of other normal pregnancy, antepartum (Primary)  Overview:  BENJIE finalized:  6/17/24 based on LMP, confirmed with BSUS CRL at 9w3d    cfDNA-neg, XX, CF/SMA-neg, declined AFP     COVID: Recommended, declined  Flu: Recommended, declined  Tdap:  Recommended, s/p Rx 3/5/2024     FU TPA 29-32weeks:    Sterilization ?:  Declines 3/5/2024     Cvx length:   1/3/24 4.3cm  1/23/24  4.7cm  2/8/24 4.25cm  Anatomy US: 2/8/24 [facility] consistent w dates, posterior placenta, breech, wnl completed  FU US:    EPDS: 3 at new OB visit, 0 on #10  GTT:     PROBLEM LIST/PLAN:   Hx of PTD 35 weeks G4  recommended vaginal progesterone nightly at 16 weeks-36 weeks- declined  cervical length 16-24 weeks - wnl at 21+2, no more needed  Anxiety and depression- Off Lexapro.  Using doxylamine PRN anxiety- continue.  Stable    Fatigue in pregnancy-normal TSH, low free T4, normal total T4.  Recheck free T4 postpartum  Low lying placenta-16 week US-Pelvic rest, resolved    Orders:  -     POC Urinalysis Dipstick  -     CBC (No Diff)  -     Gestational Diabetes Screen 1 Hour      Counseling:    OB precautions, leaking, VB, kanika maldonado vs PTL/Labor  [facility]    Reassuring pregnancy progress. Questions answered  Continue PNV.  Importance of healthy eating, obtaining sufficient sleep, and staying active unless hypertensive- activity modified as directed.    Return in about 3 weeks (around 3/26/2024) for FU OB then OV [id number]  x2.            Rosa Grady,   03/05/2024    [facility]  [address] [place] [address]  Dept: [phone]  Dept Fax: [phone]  Loc: [phone]  Loc Fax: [phone]

## 2024-03-05 ENCOUNTER — TELEPHONE (OUTPATIENT)
Dept: OBSTETRICS AND GYNECOLOGY | Facility: CLINIC | Age: 29
End: 2024-03-05
Payer: COMMERCIAL

## 2024-03-05 ENCOUNTER — ROUTINE PRENATAL (OUTPATIENT)
Dept: OBSTETRICS AND GYNECOLOGY | Facility: CLINIC | Age: 29
End: 2024-03-05
Payer: COMMERCIAL

## 2024-03-05 VITALS — WEIGHT: 169.2 LBS | DIASTOLIC BLOOD PRESSURE: 73 MMHG | BODY MASS INDEX: 29.04 KG/M2 | SYSTOLIC BLOOD PRESSURE: 117 MMHG

## 2024-03-05 DIAGNOSIS — Z34.80 SUPERVISION OF OTHER NORMAL PREGNANCY, ANTEPARTUM: Primary | ICD-10-CM

## 2024-03-05 LAB
DEPRECATED RDW RBC AUTO: 40.9 FL (ref 37–54)
ERYTHROCYTE [DISTWIDTH] IN BLOOD BY AUTOMATED COUNT: 12.6 % (ref 12.3–15.4)
GLUCOSE 1H P GLC SERPL-MCNC: 107 MG/DL (ref 65–139)
GLUCOSE UR STRIP-MCNC: NEGATIVE MG/DL
HCT VFR BLD AUTO: 34.4 % (ref 34–46.6)
HGB BLD-MCNC: 11.6 G/DL (ref 12–15.9)
MCH RBC QN AUTO: 30.2 PG (ref 26.6–33)
MCHC RBC AUTO-ENTMCNC: 33.7 G/DL (ref 31.5–35.7)
MCV RBC AUTO: 89.6 FL (ref 79–97)
PLATELET # BLD AUTO: 253 10*3/MM3 (ref 140–450)
PMV BLD AUTO: 11.5 FL (ref 6–12)
PROT UR STRIP-MCNC: NEGATIVE MG/DL
RBC # BLD AUTO: 3.84 10*6/MM3 (ref 3.77–5.28)
WBC NRBC COR # BLD AUTO: 11.33 10*3/MM3 (ref 3.4–10.8)

## 2024-03-05 PROCEDURE — 85027 COMPLETE CBC AUTOMATED: CPT | Performed by: OBSTETRICS & GYNECOLOGY

## 2024-03-05 PROCEDURE — 0502F SUBSEQUENT PRENATAL CARE: CPT | Performed by: OBSTETRICS & GYNECOLOGY

## 2024-03-05 PROCEDURE — 82950 GLUCOSE TEST: CPT | Performed by: OBSTETRICS & GYNECOLOGY

## 2024-03-05 PROCEDURE — 36415 COLL VENOUS BLD VENIPUNCTURE: CPT | Performed by: OBSTETRICS & GYNECOLOGY

## 2024-03-27 PROBLEM — O44.40 LOW-LYING PLACENTA: Status: RESOLVED | Noted: 2024-01-04 | Resolved: 2024-03-27

## 2024-03-28 ENCOUNTER — ROUTINE PRENATAL (OUTPATIENT)
Dept: OBSTETRICS AND GYNECOLOGY | Facility: CLINIC | Age: 29
End: 2024-03-28
Payer: COMMERCIAL

## 2024-03-28 VITALS — BODY MASS INDEX: 29.63 KG/M2 | WEIGHT: 172.6 LBS | SYSTOLIC BLOOD PRESSURE: 124 MMHG | DIASTOLIC BLOOD PRESSURE: 82 MMHG

## 2024-03-28 DIAGNOSIS — O09.899 H/O PRETERM DELIVERY, CURRENTLY PREGNANT: ICD-10-CM

## 2024-03-28 DIAGNOSIS — Z34.80 SUPERVISION OF OTHER NORMAL PREGNANCY, ANTEPARTUM: Primary | ICD-10-CM

## 2024-03-28 LAB
GLUCOSE UR STRIP-MCNC: NEGATIVE MG/DL
PROT UR STRIP-MCNC: NEGATIVE MG/DL

## 2024-03-28 PROCEDURE — 0502F SUBSEQUENT PRENATAL CARE: CPT | Performed by: OBSTETRICS & GYNECOLOGY

## 2024-03-28 NOTE — PROGRESS NOTES
Routine Prenatal Visit     Subjective  Lillian Kebede is a 28 y.o.  at 28w3d here for her routine OB visit.   She is taking her prenatal vitamins.Reports no loss of fluid or vaginal bleeding. Patient doing well without any complaints. Pregnancy complicated by:     Patient Active Problem List   Diagnosis    Anxiety and depression    Supervision of other normal pregnancy, antepartum    H/O  delivery, currently pregnant    Diastasis of symphysis pubis    Abnormal serum thyroxine (T4) level         OB History    Para Term  AB Living   5 4 3 1   4   SAB IAB Ectopic Molar Multiple Live Births             4      # Outcome Date GA Lbr Teodoro/2nd Weight Sex Type Anes PTL Lv   5 Current            4  20 35w0d  2637 g (5 lb 13 oz) M Vag-Spont EPI Y PRESTON   3 Term 17 40w0d  2948 g (6 lb 8 oz) M Vag-Spont EPI N PRESTON   2 Term 03/10/16 40w0d  3657 g (8 lb 1 oz) M Vag-Spont EPI N PRESTON   1 Term 13 40w0d  2977 g (6 lb 9 oz) M Vag-Spont EPI N PRESTON      Obstetric Comments   MENARCHE 11 YRS OLD REGULAR CYCLES, LASTING 5 DAYS           ROS:   General ROS: negative for - chills or fatigue  Respiratory ROS: negative for - cough or hemoptysis  Cardiovascular ROS: negative for - chest pain or dyspnea on exertion  Genito-Urinary ROS: negative for  change in urinary stream, vaginal discharge   Musculoskeletal ROS: negative for - gait disturbance or joint pain  Dermatological ROS: negative for acne,  dry skin or itching    Objective  Physical Exam:   Vitals:    24 0858   BP: 124/82       Uterine Size: size equals dates  FHT: 110-160 BPM    General appearance - alert, well appearing, and in no distress  Mental status - alert, oriented to person, place, and time  Abdomen- Soft, Gravid uterus, non-tender to palpation  Musculoskeletal: negative for - gait disturbance or joint pain  Extremeties: negative swelling or cyanosis   Dermatological: negative rashes or skin lesions        Assessment/Plan:   Diagnoses and all orders for this visit:    1. Supervision of other normal pregnancy, antepartum (Primary)  -     POC Urinalysis Dipstick    2. H/O  delivery, currently pregnant            Counseling:   OB precautions, leaking, VB, kanika maldonado vs PTL/Labor  [facility]  HTN precautions reviewed: HA, vision change, RUQ/epigastric pain, edema  Round Ligament Pain:  The uterus has several ligaments which provide support and keep the uterus in place. As the  uterus grows these ligaments are pulled and stretched which often causes sharp stabbing like pain in the inguinal area.   You may find a pregnancy support band helpful. Changing positions may also help. Yoga is a great way to cope with round ligament and low back pain in pregnancy.    Massage may also help with low back pain   Things to Consider at this Point in your Pregnancy:  Some women experience swelling in their feet during pregnancy. Compression stockings may help  Drink plenty of water and stay active   Make sure you are eating frequent small meals, nuts are a wonderful snack to keep with you            Return in about 2 weeks (around 2024) for Routine OB visit.      We have gone over prenatal care to include the timing and content of visits. I informed her how to contact the office and/or on call person in the event of any problems and encouraged her to do so when she feels it is necessary.  We then spent time answering her questions which she indicated were answered to her satisfaction.    Lillian Mcnulty DO  3/28/2024 09:07 EDT

## 2024-04-08 NOTE — PROGRESS NOTES
Routine Prenatal Visit     Subjective  Lillian Kebede is a 28 y.o.  at 30w1d here for her routine OB visit.   She is taking her prenatal vitamins.Reports no loss of fluid or vaginal bleeding. Patient doing well without any complaints. Pregnancy complicated by:     Patient Active Problem List   Diagnosis    Anxiety and depression    Supervision of other normal pregnancy, antepartum    H/O  delivery, currently pregnant    Diastasis of symphysis pubis    Abnormal serum thyroxine (T4) level         OB History    Para Term  AB Living   5 4 3 1   4   SAB IAB Ectopic Molar Multiple Live Births             4      # Outcome Date GA Lbr Teodoro/2nd Weight Sex Type Anes PTL Lv   5 Current            4  20 35w0d  2637 g (5 lb 13 oz) M Vag-Spont EPI Y PRESTON   3 Term 17 40w0d  2948 g (6 lb 8 oz) M Vag-Spont EPI N PRETSON   2 Term 03/10/16 40w0d  3657 g (8 lb 1 oz) M Vag-Spont EPI N PRESTON   1 Term 13 40w0d  2977 g (6 lb 9 oz) M Vag-Spont EPI N PRESTON      Obstetric Comments   MENARCHE 12 YRS OLD REGULAR CYCLES, LASTING 5 DAYS           ROS:   General ROS: negative for - chills or fatigue  Respiratory ROS: negative for - cough or hemoptysis  Cardiovascular ROS: negative for - chest pain or dyspnea on exertion  Genito-Urinary ROS: negative for  change in urinary stream, vaginal discharge   Musculoskeletal ROS: negative for - gait disturbance or joint pain  Dermatological ROS: negative for acne,  dry skin or itching    Objective  Physical Exam:   Vitals:    24 0843   BP: 116/78       Uterine Size: size equals dates  FHT: 110-160 BPM    General appearance - alert, well appearing, and in no distress  Mental status - alert, oriented to person, place, and time  Abdomen- Soft, Gravid uterus, non-tender to palpation  Musculoskeletal: negative for - gait disturbance or joint pain  Extremeties: negative swelling or cyanosis   Dermatological: negative rashes or skin lesions        Assessment/Plan:   Diagnoses and all orders for this visit:    1. Supervision of other normal pregnancy, antepartum (Primary)  Assessment & Plan:  PROBLEM LIST/PLAN:   Hx of  labor-35 weeks G4, recommend vaginal progesterone nightly at 16 weeks-36 weeks, cervical length 16-24 weeks   Anxiety and depression-Zoloft 25mg daily     Orders:  -     POC Urinalysis Dipstick    2. H/O  delivery, currently pregnant            Counseling:   OB precautions, leaking, VB, kanika maldonado vs PTL/Labor  [facility]  HTN precautions reviewed: HA, vision change, RUQ/epigastric pain, edema  Round Ligament Pain:  The uterus has several ligaments which provide support and keep the uterus in place. As the  uterus grows these ligaments are pulled and stretched which often causes sharp stabbing like pain in the inguinal area.   You may find a pregnancy support band helpful. Changing positions may also help. Yoga is a great way to cope with round ligament and low back pain in pregnancy.    Massage may also help with low back pain   Things to Consider at this Point in your Pregnancy:  Some women experience swelling in their feet during pregnancy. Compression stockings may help  Drink plenty of water and stay active   Make sure you are eating frequent small meals, nuts are a wonderful snack to keep with you            Return in about 2 weeks (around 2024) for Routine OB visit.      We have gone over prenatal care to include the timing and content of visits. I informed her how to contact the office and/or on call person in the event of any problems and encouraged her to do so when she feels it is necessary.  We then spent time answering her questions which she indicated were answered to her satisfaction.    Lillian Mcnulty DO  2024 09:07 EDT

## 2024-04-09 ENCOUNTER — ROUTINE PRENATAL (OUTPATIENT)
Dept: OBSTETRICS AND GYNECOLOGY | Facility: CLINIC | Age: 29
End: 2024-04-09
Payer: COMMERCIAL

## 2024-04-09 VITALS — WEIGHT: 172.8 LBS | DIASTOLIC BLOOD PRESSURE: 78 MMHG | BODY MASS INDEX: 29.66 KG/M2 | SYSTOLIC BLOOD PRESSURE: 116 MMHG

## 2024-04-09 DIAGNOSIS — Z34.80 SUPERVISION OF OTHER NORMAL PREGNANCY, ANTEPARTUM: Primary | ICD-10-CM

## 2024-04-09 DIAGNOSIS — O09.899 H/O PRETERM DELIVERY, CURRENTLY PREGNANT: ICD-10-CM

## 2024-04-09 LAB
GLUCOSE UR STRIP-MCNC: NEGATIVE MG/DL
PROT UR STRIP-MCNC: NEGATIVE MG/DL

## 2024-04-09 PROCEDURE — 0502F SUBSEQUENT PRENATAL CARE: CPT | Performed by: OBSTETRICS & GYNECOLOGY

## 2024-04-22 NOTE — ASSESSMENT & PLAN NOTE
PROBLEM LIST/PLAN:   Hx of PTD 35 weeks G4  recommended vaginal progesterone nightly at 16 weeks-36 weeks- declined  cervical length 16-24 weeks - wnl at 21+2, no more needed  Anxiety and depression- Off Lexapro.  Using doxylamine PRN anxiety- continue.  Stable    Fatigue in pregnancy-normal TSH, low free T4, normal total T4.  Recheck free T4 postpartum  Low lying placenta-16 week US-Pelvic rest, resolved

## 2024-04-22 NOTE — PROGRESS NOTES
Routine Prenatal Visit     Subjective  Lillian Kebede is a 28 y.o.  at 32w1d here for her routine OB visit.   She is taking her prenatal vitamins.Reports no loss of fluid or vaginal bleeding. Patient doing well without any complaints. Pregnancy complicated by:     Patient Active Problem List   Diagnosis    Anxiety and depression    Supervision of other normal pregnancy, antepartum    H/O  delivery, currently pregnant    Diastasis of symphysis pubis    Abnormal serum thyroxine (T4) level         OB History    Para Term  AB Living   5 4 3 1   4   SAB IAB Ectopic Molar Multiple Live Births             4      # Outcome Date GA Lbr Teodoor/2nd Weight Sex Type Anes PTL Lv   5 Current            4  20 35w0d  2637 g (5 lb 13 oz) M Vag-Spont EPI Y PRESTON   3 Term 17 40w0d  2948 g (6 lb 8 oz) M Vag-Spont EPI N PRESTON   2 Term 03/10/16 40w0d  3657 g (8 lb 1 oz) M Vag-Spont EPI N PRESTON   1 Term 13 40w0d  2977 g (6 lb 9 oz) M Vag-Spont EPI N PRESTON      Obstetric Comments   MENARCHE 12 YRS OLD REGULAR CYCLES, LASTING 5 DAYS           ROS:   General ROS: negative for - chills or fatigue  Respiratory ROS: negative for - cough or hemoptysis  Cardiovascular ROS: negative for - chest pain or dyspnea on exertion  Genito-Urinary ROS: negative for  change in urinary stream, vaginal discharge   Musculoskeletal ROS: negative for - gait disturbance or joint pain  Dermatological ROS: negative for acne,  dry skin or itching    Objective  Physical Exam:   Vitals:    24 0929   BP: 124/78       Uterine Size: size less than dates  FHT: 110-160 BPM    General appearance - alert, well appearing, and in no distress  Mental status - alert, oriented to person, place, and time  Abdomen- Soft, Gravid uterus, non-tender to palpation  Musculoskeletal: negative for - gait disturbance or joint pain  Extremeties: negative swelling or cyanosis   Dermatological: negative rashes or skin lesions        Assessment/Plan:   Diagnoses and all orders for this visit:    1. Supervision of other normal pregnancy, antepartum (Primary)  Assessment & Plan:  PROBLEM LIST/PLAN:   Hx of PTD 35 weeks G4  recommended vaginal progesterone nightly at 16 weeks-36 weeks- declined  cervical length 16-24 weeks - wnl at 21+2, no more needed  Anxiety and depression- Off Lexapro.  Using doxylamine PRN anxiety- continue.  Stable    Fatigue in pregnancy-normal TSH, low free T4, normal total T4.  Recheck free T4 postpartum  Low lying placenta-16 week US-Pelvic rest, resolved    Orders:  -     POC Urinalysis Dipstick    2. H/O  delivery, currently pregnant    3. Pregnancy related fatigue, antepartum    4. Uterine size-date discrepancy in third trimester  -     US Ob 14 + Weeks Single or First Gestation; Future            Counseling:   OB precautions, leaking, VB, kanika maldonado vs PTL/Labor  [facility]  HTN precautions reviewed: HA, vision change, RUQ/epigastric pain, edema  Round Ligament Pain:  The uterus has several ligaments which provide support and keep the uterus in place. As the  uterus grows these ligaments are pulled and stretched which often causes sharp stabbing like pain in the inguinal area.   You may find a pregnancy support band helpful. Changing positions may also help. Yoga is a great way to cope with round ligament and low back pain in pregnancy.    Massage may also help with low back pain   Things to Consider at this Point in your Pregnancy:  Some women experience swelling in their feet during pregnancy. Compression stockings may help  Drink plenty of water and stay active   Make sure you are eating frequent small meals, nuts are a wonderful snack to keep with you            Return in about 2 weeks (around 2024) for Routine OB visit.      We have gone over prenatal care to include the timing and content of visits. I informed her how to contact the office and/or on call person in the event of any problems and  encouraged her to do so when she feels it is necessary.  We then spent time answering her questions which she indicated were answered to her satisfaction.    Lillian Mcnulty DO  4/23/2024 09:49 EDT

## 2024-04-23 ENCOUNTER — TELEPHONE (OUTPATIENT)
Dept: OBSTETRICS AND GYNECOLOGY | Facility: CLINIC | Age: 29
End: 2024-04-23
Payer: COMMERCIAL

## 2024-04-23 ENCOUNTER — ROUTINE PRENATAL (OUTPATIENT)
Dept: OBSTETRICS AND GYNECOLOGY | Facility: CLINIC | Age: 29
End: 2024-04-23
Payer: COMMERCIAL

## 2024-04-23 VITALS — WEIGHT: 174 LBS | SYSTOLIC BLOOD PRESSURE: 124 MMHG | BODY MASS INDEX: 29.87 KG/M2 | DIASTOLIC BLOOD PRESSURE: 78 MMHG

## 2024-04-23 DIAGNOSIS — O26.843 UTERINE SIZE-DATE DISCREPANCY IN THIRD TRIMESTER: ICD-10-CM

## 2024-04-23 DIAGNOSIS — O26.819 PREGNANCY RELATED FATIGUE, ANTEPARTUM: ICD-10-CM

## 2024-04-23 DIAGNOSIS — O09.899 H/O PRETERM DELIVERY, CURRENTLY PREGNANT: ICD-10-CM

## 2024-04-23 DIAGNOSIS — Z34.80 SUPERVISION OF OTHER NORMAL PREGNANCY, ANTEPARTUM: Primary | ICD-10-CM

## 2024-04-23 LAB
GLUCOSE UR STRIP-MCNC: NEGATIVE MG/DL
PROT UR STRIP-MCNC: NEGATIVE MG/DL

## 2024-04-23 PROCEDURE — 0502F SUBSEQUENT PRENATAL CARE: CPT | Performed by: OBSTETRICS & GYNECOLOGY

## 2024-05-06 ENCOUNTER — ROUTINE PRENATAL (OUTPATIENT)
Dept: OBSTETRICS AND GYNECOLOGY | Facility: CLINIC | Age: 29
End: 2024-05-06
Payer: COMMERCIAL

## 2024-05-06 VITALS — BODY MASS INDEX: 29.49 KG/M2 | WEIGHT: 171.8 LBS | DIASTOLIC BLOOD PRESSURE: 75 MMHG | SYSTOLIC BLOOD PRESSURE: 112 MMHG

## 2024-05-06 DIAGNOSIS — Z34.80 SUPERVISION OF OTHER NORMAL PREGNANCY, ANTEPARTUM: Primary | ICD-10-CM

## 2024-05-06 LAB
GLUCOSE UR STRIP-MCNC: NEGATIVE MG/DL
PROT UR STRIP-MCNC: NEGATIVE MG/DL

## 2024-05-06 PROCEDURE — 0502F SUBSEQUENT PRENATAL CARE: CPT | Performed by: OBSTETRICS & GYNECOLOGY

## 2024-05-16 NOTE — PROGRESS NOTES
OB FOLLOW UP      Chief Complaint   Patient presents with    Routine Prenatal Visit     Pt doesn't want to be checked but will do the GBS swab.       Subjective:   Eating well, no vomiting.  No complaints    Objective:  /70   Wt 77.5 kg (170 lb 12.8 oz)   LMP 09/11/2023   BMI 29.32 kg/m²  3.538 kg (7 lb 12.8 oz) Facility age limit for growth %prema is 20 years.  See OB flow sheet for fundal height (not performed if US day of OV), FHT, edema, cvx exam if performed, and Upro/Uglu  Chaperone present during pelvic exam if performed.  GBS RV swab performed today.  Cvx exam recommended, berto w hx of PTD, pt declines    Assessment and Plan:  36w0d     Diagnoses and all orders for this visit:    1. Supervision of other normal pregnancy, antepartum (Primary)  Overview:  BENJIE finalized:  6/17/24 based on LMP, confirmed with BSUS CRL at 9w3d    cfDNA-neg, XX, CF/SMA-neg, declined AFP     COVID: Recommended, declined  Flu: Recommended, declined  Tdap:  Recommended, s/p Rx     Sterilization ?:  Declined, plans vasectomy    Cvx length:   1/3/24 4.3cm  1/23/24  4.7cm  2/8/24 4.25cm  Anatomy US: 2/8/24 [facility] consistent w dates, posterior placenta, breech, wnl completed  FU US: [facility] 5/20/24  6#10oz 68%, AC 77%, MIN 14, VTX, grade 1 placenta    EPDS: 3 at new OB visit, 0 on #10    PROBLEM LIST/PLAN:   Hx of PTD 35 weeks G4  recommended vaginal progesterone nightly at 16 weeks-36 weeks- declined  cervical length 16-24 weeks - wnl at 21+2, no more needed  Anxiety and depression- Off Lexapro.  Plan restart PP. Using doxylamine PRN anxiety- continue.  Stable    Fatigue in pregnancy-normal TSH, low free T4, normal total T4.  Recheck free T4 postpartum  Low lying placenta-16 week US, FU US resolved    Orders:  -     POC Urinalysis Dipstick  -     Group B Strep (Molecular) - Swab, Vaginal/Rectum      Counseling:    OB precautions, leaking, VB, kanika maldonado vs PTL/Labor  [facility]C    Reassuring pregnancy progress. Questions  answered.  Continue PNV.  Importance of healthy eating, obtaining sufficient sleep, and staying active unless hypertensive- activity modified as directed.    Return in about 1 week (around 5/27/2024) for FU OB q1wk to BENJIE/Delivery.            Rosa Grady,   05/20/2024    [facility]  [address] [place] [address]  Dept: [phone]  Dept Fax: [phone]  Loc: [phone]  Loc Fax: [phone]

## 2024-05-20 ENCOUNTER — ROUTINE PRENATAL (OUTPATIENT)
Dept: OBSTETRICS AND GYNECOLOGY | Facility: CLINIC | Age: 29
End: 2024-05-20
Payer: COMMERCIAL

## 2024-05-20 VITALS — DIASTOLIC BLOOD PRESSURE: 70 MMHG | WEIGHT: 170.8 LBS | BODY MASS INDEX: 29.32 KG/M2 | SYSTOLIC BLOOD PRESSURE: 118 MMHG

## 2024-05-20 DIAGNOSIS — Z34.80 SUPERVISION OF OTHER NORMAL PREGNANCY, ANTEPARTUM: Primary | ICD-10-CM

## 2024-05-20 LAB
GLUCOSE UR STRIP-MCNC: NEGATIVE MG/DL
PROT UR STRIP-MCNC: NEGATIVE MG/DL

## 2024-05-20 PROCEDURE — 87653 STREP B DNA AMP PROBE: CPT | Performed by: OBSTETRICS & GYNECOLOGY

## 2024-05-20 PROCEDURE — 0502F SUBSEQUENT PRENATAL CARE: CPT | Performed by: OBSTETRICS & GYNECOLOGY

## 2024-05-21 LAB — GROUP B STREP, DNA: POSITIVE

## 2024-05-28 NOTE — PROGRESS NOTES
OB FOLLOW UP      Chief Complaint   Patient presents with    Routine Prenatal Visit     Subjective:   No complaints    Objective:  /84   Wt 78.5 kg (173 lb)   LMP 09/11/2023   BMI 29.70 kg/m²  4.536 kg (10 lb) Facility age limit for growth %prema is 20 years.  See OB flow sheet for fundal height (not performed if US day of OV), FHT, edema, cvx exam if performed, and Upro/Uglu  Chaperone present during pelvic exam if performed.      Assessment and Plan:  37w4d     Diagnoses and all orders for this visit:    1. Supervision of other normal pregnancy, antepartum (Primary)  Overview:  BENJIE finalized:  6/17/24 based on LMP, confirmed with BSUS CRL at 9w3d    cfDNA-neg, XX, CF/SMA-neg, declined AFP     COVID: Recommended, declined  Flu: Recommended, declined  Tdap:  Recommended, s/p Rx     Sterilization ?:  Declined, plans vasectomy    Cvx length:   1/3/24 4.3cm  1/23/24  4.7cm  2/8/24 4.25cm  Anatomy US: 2/8/24 [facility] consistent w dates, posterior placenta, breech, wnl completed  FU US: [facility] 5/20/24  6#10oz 68%, AC 77%, MIN 14, VTX, grade 1 placenta    EPDS: 3 at new OB visit, 0 on #10    PROBLEM LIST/PLAN:   Hx of PTD 35 weeks G4  recommended vaginal progesterone nightly at 16 weeks-36 weeks- declined  cervical length 16-24 weeks - wnl at 21+2, no more needed  Anxiety and depression- Off Lexapro.  Plan restart PP. Using doxylamine PRN anxiety- continue.  Stable    Fatigue in pregnancy-normal TSH, low free T4, normal total T4.  Recheck free T4 postpartum  Low lying placenta-16 week US, FU US resolved    Orders:  -     POC Urinalysis Dipstick      Counseling:    OB precautions, leaking, VB, kanika maldonado vs PTL/Labor  [facility]  IOL scheduled    Reassuring pregnancy progress. Questions answered.  Continue PNV.  Importance of healthy eating, obtaining sufficient sleep, and staying active unless hypertensive- activity modified as directed.    Return in about 1 week (around 6/7/2024) for FU OB q1wk to  BENJIE/Delivery.            Rosa Grady,   05/31/2024    [facility] [place] [facility]  [address] [place] [address]  Dept: [phone]  Dept Fax: [phone]  Loc: [phone]  Loc Fax: [phone]

## 2024-05-31 ENCOUNTER — ROUTINE PRENATAL (OUTPATIENT)
Dept: OBSTETRICS AND GYNECOLOGY | Facility: CLINIC | Age: 29
End: 2024-05-31
Payer: COMMERCIAL

## 2024-05-31 ENCOUNTER — HOSPITAL ENCOUNTER (INPATIENT)
Facility: HOSPITAL | Age: 29
LOS: 2 days | Discharge: HOME | End: 2024-06-03
Attending: OBSTETRICS & GYNECOLOGY | Admitting: OBSTETRICS & GYNECOLOGY
Payer: COMMERCIAL

## 2024-05-31 VITALS — WEIGHT: 173 LBS | SYSTOLIC BLOOD PRESSURE: 129 MMHG | DIASTOLIC BLOOD PRESSURE: 84 MMHG | BODY MASS INDEX: 29.7 KG/M2

## 2024-05-31 DIAGNOSIS — Z34.80 SUPERVISION OF OTHER NORMAL PREGNANCY, ANTEPARTUM: Primary | ICD-10-CM

## 2024-05-31 PROBLEM — O47.9 UTERINE CONTRACTIONS: Status: ACTIVE | Noted: 2024-05-31

## 2024-05-31 LAB
GLUCOSE UR STRIP-MCNC: NEGATIVE MG/DL
PROT UR STRIP-MCNC: NEGATIVE MG/DL

## 2024-05-31 PROCEDURE — 81002 URINALYSIS NONAUTO W/O SCOPE: CPT | Performed by: OBSTETRICS & GYNECOLOGY

## 2024-05-31 RX ORDER — METHYLERGONOVINE MALEATE 0.2 MG/ML
200 INJECTION INTRAVENOUS ONCE AS NEEDED
Status: DISCONTINUED | OUTPATIENT
Start: 2024-05-31 | End: 2024-06-01 | Stop reason: HOSPADM

## 2024-05-31 RX ORDER — LIDOCAINE HYDROCHLORIDE 10 MG/ML
0.5 INJECTION, SOLUTION EPIDURAL; INFILTRATION; INTRACAUDAL; PERINEURAL ONCE AS NEEDED
Status: DISCONTINUED | OUTPATIENT
Start: 2024-05-31 | End: 2024-05-31

## 2024-05-31 RX ORDER — ONDANSETRON 4 MG/1
4 TABLET, ORALLY DISINTEGRATING ORAL EVERY 6 HOURS PRN
Status: DISCONTINUED | OUTPATIENT
Start: 2024-05-31 | End: 2024-06-01 | Stop reason: HOSPADM

## 2024-05-31 RX ORDER — SODIUM CHLORIDE 0.9 % (FLUSH) 0.9 %
10 SYRINGE (ML) INJECTION EVERY 12 HOURS SCHEDULED
Status: DISCONTINUED | OUTPATIENT
Start: 2024-06-01 | End: 2024-05-31

## 2024-05-31 RX ORDER — TERBUTALINE SULFATE 1 MG/ML
0.25 INJECTION, SOLUTION SUBCUTANEOUS AS NEEDED
Status: DISCONTINUED | OUTPATIENT
Start: 2024-05-31 | End: 2024-06-01 | Stop reason: HOSPADM

## 2024-05-31 RX ORDER — ONDANSETRON 2 MG/ML
4 INJECTION INTRAMUSCULAR; INTRAVENOUS EVERY 6 HOURS PRN
Status: DISCONTINUED | OUTPATIENT
Start: 2024-05-31 | End: 2024-06-01 | Stop reason: HOSPADM

## 2024-05-31 RX ORDER — CARBOPROST TROMETHAMINE 250 UG/ML
250 INJECTION, SOLUTION INTRAMUSCULAR AS NEEDED
Status: DISCONTINUED | OUTPATIENT
Start: 2024-05-31 | End: 2024-06-01 | Stop reason: HOSPADM

## 2024-05-31 RX ORDER — SODIUM CHLORIDE, SODIUM LACTATE, POTASSIUM CHLORIDE, CALCIUM CHLORIDE 600; 310; 30; 20 MG/100ML; MG/100ML; MG/100ML; MG/100ML
125 INJECTION, SOLUTION INTRAVENOUS CONTINUOUS
Status: DISCONTINUED | OUTPATIENT
Start: 2024-06-01 | End: 2024-06-01

## 2024-05-31 RX ORDER — LIDOCAINE HYDROCHLORIDE 10 MG/ML
0.5 INJECTION, SOLUTION EPIDURAL; INFILTRATION; INTRACAUDAL; PERINEURAL ONCE AS NEEDED
Status: DISCONTINUED | OUTPATIENT
Start: 2024-05-31 | End: 2024-06-01 | Stop reason: HOSPADM

## 2024-05-31 RX ORDER — MISOPROSTOL 200 UG/1
800 TABLET ORAL AS NEEDED
Status: DISCONTINUED | OUTPATIENT
Start: 2024-05-31 | End: 2024-06-01 | Stop reason: HOSPADM

## 2024-05-31 RX ORDER — SODIUM CHLORIDE 9 MG/ML
40 INJECTION, SOLUTION INTRAVENOUS AS NEEDED
Status: DISCONTINUED | OUTPATIENT
Start: 2024-05-31 | End: 2024-05-31

## 2024-05-31 RX ORDER — SODIUM CHLORIDE 0.9 % (FLUSH) 0.9 %
10 SYRINGE (ML) INJECTION AS NEEDED
Status: DISCONTINUED | OUTPATIENT
Start: 2024-05-31 | End: 2024-05-31

## 2024-05-31 RX ORDER — ACETAMINOPHEN 325 MG/1
650 TABLET ORAL EVERY 4 HOURS PRN
Status: DISCONTINUED | OUTPATIENT
Start: 2024-05-31 | End: 2024-06-01 | Stop reason: HOSPADM

## 2024-05-31 RX ORDER — CITRIC ACID/SODIUM CITRATE 334-500MG
30 SOLUTION, ORAL ORAL ONCE AS NEEDED
Status: DISCONTINUED | OUTPATIENT
Start: 2024-05-31 | End: 2024-06-01 | Stop reason: HOSPADM

## 2024-06-01 ENCOUNTER — ANESTHESIA EVENT (OUTPATIENT)
Dept: LABOR AND DELIVERY | Facility: HOSPITAL | Age: 29
End: 2024-06-01
Payer: COMMERCIAL

## 2024-06-01 ENCOUNTER — ANESTHESIA (OUTPATIENT)
Dept: LABOR AND DELIVERY | Facility: HOSPITAL | Age: 29
End: 2024-06-01
Payer: COMMERCIAL

## 2024-06-01 PROBLEM — Z34.80 SUPERVISION OF OTHER NORMAL PREGNANCY, ANTEPARTUM: Status: RESOLVED | Noted: 2023-11-22 | Resolved: 2024-06-01

## 2024-06-01 PROBLEM — O47.9 UTERINE CONTRACTIONS: Status: RESOLVED | Noted: 2024-05-31 | Resolved: 2024-06-01

## 2024-06-01 PROBLEM — O09.899 H/O PRETERM DELIVERY, CURRENTLY PREGNANT: Status: RESOLVED | Noted: 2023-11-22 | Resolved: 2024-06-01

## 2024-06-01 LAB
ABO GROUP BLD: NORMAL
AMPHET+METHAMPHET UR QL: NEGATIVE
BARBITURATES UR QL SCN: NEGATIVE
BASE EXCESS BLDCOA CALC-SCNC: -4.7 MMOL/L (ref -2–2)
BASE EXCESS BLDCOV CALC-SCNC: -1.8 MMOL/L (ref -2–2)
BASOPHILS # BLD AUTO: 0.03 10*3/MM3 (ref 0–0.2)
BASOPHILS NFR BLD AUTO: 0.2 % (ref 0–1.5)
BENZODIAZ UR QL SCN: NEGATIVE
BILIRUB BLD-MCNC: NEGATIVE MG/DL
BLD GP AB SCN SERPL QL: NEGATIVE
CANNABINOIDS SERPL QL: NEGATIVE
CLARITY, POC: CLEAR
COCAINE UR QL: NEGATIVE
COLOR UR: YELLOW
DEPRECATED RDW RBC AUTO: 40.4 FL (ref 37–54)
EOSINOPHIL # BLD AUTO: 0.12 10*3/MM3 (ref 0–0.4)
EOSINOPHIL NFR BLD AUTO: 0.8 % (ref 0.3–6.2)
ERYTHROCYTE [DISTWIDTH] IN BLOOD BY AUTOMATED COUNT: 13 % (ref 12.3–15.4)
FENTANYL UR-MCNC: NEGATIVE NG/ML
GLUCOSE UR STRIP-MCNC: NEGATIVE MG/DL
HCO3 BLDCOA-SCNC: 23.6 MMOL/L
HCO3 BLDCOV-SCNC: 21.8 MMOL/L
HCT VFR BLD AUTO: 33.5 % (ref 34–46.6)
HGB BLD-MCNC: 11.2 G/DL (ref 12–15.9)
IMM GRANULOCYTES # BLD AUTO: 0.05 10*3/MM3 (ref 0–0.05)
IMM GRANULOCYTES NFR BLD AUTO: 0.3 % (ref 0–0.5)
KETONES UR QL: ABNORMAL
LEUKOCYTE EST, POC: ABNORMAL
LYMPHOCYTES # BLD AUTO: 3.65 10*3/MM3 (ref 0.7–3.1)
LYMPHOCYTES NFR BLD AUTO: 24 % (ref 19.6–45.3)
MCH RBC QN AUTO: 28.4 PG (ref 26.6–33)
MCHC RBC AUTO-ENTMCNC: 33.4 G/DL (ref 31.5–35.7)
MCV RBC AUTO: 84.8 FL (ref 79–97)
METHADONE UR QL SCN: NEGATIVE
MONOCYTES # BLD AUTO: 1.52 10*3/MM3 (ref 0.1–0.9)
MONOCYTES NFR BLD AUTO: 10 % (ref 5–12)
NEUTROPHILS NFR BLD AUTO: 64.7 % (ref 42.7–76)
NEUTROPHILS NFR BLD AUTO: 9.83 10*3/MM3 (ref 1.7–7)
NITRITE UR-MCNC: NEGATIVE MG/ML
NRBC BLD AUTO-RTO: 0 /100 WBC (ref 0–0.2)
OPIATES UR QL: NEGATIVE
OXYCODONE UR QL SCN: NEGATIVE
PCO2 BLDCOA: 56.6 MMHG (ref 33–49)
PCO2 BLDCOV: 33.9 MM HG (ref 28–40)
PH BLDCOA: 7.24 PH UNITS (ref 7.21–7.31)
PH BLDCOV: 7.43 PH UNITS (ref 7.31–7.37)
PH UR: 7 [PH] (ref 5–8)
PLATELET # BLD AUTO: 264 10*3/MM3 (ref 140–450)
PMV BLD AUTO: 11 FL (ref 6–12)
PO2 BLDCOA: <40.5 MMHG
PO2 BLDCOV: <40.5 MM HG (ref 21–31)
PROT UR STRIP-MCNC: NEGATIVE MG/DL
RBC # BLD AUTO: 3.95 10*6/MM3 (ref 3.77–5.28)
RBC # UR STRIP: ABNORMAL /UL
RH BLD: POSITIVE
SP GR UR: 1.01 (ref 1–1.03)
T PALLIDUM IGG SER QL: NORMAL
T&S EXPIRATION DATE: NORMAL
UROBILINOGEN UR QL: ABNORMAL
WBC NRBC COR # BLD AUTO: 15.2 10*3/MM3 (ref 3.4–10.8)

## 2024-06-01 PROCEDURE — 86780 TREPONEMA PALLIDUM: CPT | Performed by: OBSTETRICS & GYNECOLOGY

## 2024-06-01 PROCEDURE — 25010000002 BUPIVACAINE (PF) 0.25 % SOLUTION: Performed by: NURSE ANESTHETIST, CERTIFIED REGISTERED

## 2024-06-01 PROCEDURE — 25810000003 LACTATED RINGERS SOLUTION: Performed by: NURSE ANESTHETIST, CERTIFIED REGISTERED

## 2024-06-01 PROCEDURE — C1755 CATHETER, INTRASPINAL: HCPCS | Performed by: REGISTERED NURSE

## 2024-06-01 PROCEDURE — 85025 COMPLETE CBC W/AUTO DIFF WBC: CPT | Performed by: OBSTETRICS & GYNECOLOGY

## 2024-06-01 PROCEDURE — 25810000003 LACTATED RINGERS PER 1000 ML: Performed by: OBSTETRICS & GYNECOLOGY

## 2024-06-01 PROCEDURE — 86850 RBC ANTIBODY SCREEN: CPT | Performed by: OBSTETRICS & GYNECOLOGY

## 2024-06-01 PROCEDURE — 80307 DRUG TEST PRSMV CHEM ANLYZR: CPT | Performed by: OBSTETRICS & GYNECOLOGY

## 2024-06-01 PROCEDURE — 25010000002 PENICILLIN G POTASSIUM PER 600000 UNITS: Performed by: OBSTETRICS & GYNECOLOGY

## 2024-06-01 PROCEDURE — 25010000002 FENTANYL CITRATE (PF) 50 MCG/ML SOLUTION: Performed by: NURSE ANESTHETIST, CERTIFIED REGISTERED

## 2024-06-01 PROCEDURE — 10907ZC DRAINAGE OF AMNIOTIC FLUID, THERAPEUTIC FROM PRODUCTS OF CONCEPTION, VIA NATURAL OR ARTIFICIAL OPENING: ICD-10-PCS | Performed by: OBSTETRICS & GYNECOLOGY

## 2024-06-01 PROCEDURE — 86901 BLOOD TYPING SEROLOGIC RH(D): CPT | Performed by: OBSTETRICS & GYNECOLOGY

## 2024-06-01 PROCEDURE — 82803 BLOOD GASES ANY COMBINATION: CPT | Performed by: OBSTETRICS & GYNECOLOGY

## 2024-06-01 PROCEDURE — 86900 BLOOD TYPING SEROLOGIC ABO: CPT | Performed by: OBSTETRICS & GYNECOLOGY

## 2024-06-01 PROCEDURE — 3E033VJ INTRODUCTION OF OTHER HORMONE INTO PERIPHERAL VEIN, PERCUTANEOUS APPROACH: ICD-10-PCS | Performed by: OBSTETRICS & GYNECOLOGY

## 2024-06-01 RX ORDER — FENTANYL CITRATE 50 UG/ML
INJECTION, SOLUTION INTRAMUSCULAR; INTRAVENOUS
Status: COMPLETED | OUTPATIENT
Start: 2024-06-01 | End: 2024-06-01

## 2024-06-01 RX ORDER — CALCIUM CARBONATE 500 MG/1
2 TABLET, CHEWABLE ORAL 3 TIMES DAILY PRN
Status: DISCONTINUED | OUTPATIENT
Start: 2024-06-01 | End: 2024-06-03 | Stop reason: HOSPADM

## 2024-06-01 RX ORDER — ONDANSETRON 4 MG/1
4 TABLET, ORALLY DISINTEGRATING ORAL EVERY 6 HOURS PRN
Status: DISCONTINUED | OUTPATIENT
Start: 2024-06-01 | End: 2024-06-01 | Stop reason: HOSPADM

## 2024-06-01 RX ORDER — ACETAMINOPHEN 325 MG/1
650 TABLET ORAL EVERY 6 HOURS PRN
Status: DISCONTINUED | OUTPATIENT
Start: 2024-06-01 | End: 2024-06-03 | Stop reason: HOSPADM

## 2024-06-01 RX ORDER — OXYTOCIN/0.9 % SODIUM CHLORIDE 30/500 ML
999 PLASTIC BAG, INJECTION (ML) INTRAVENOUS ONCE
Status: DISCONTINUED | OUTPATIENT
Start: 2024-06-01 | End: 2024-06-01 | Stop reason: HOSPADM

## 2024-06-01 RX ORDER — OXYTOCIN/0.9 % SODIUM CHLORIDE 30/500 ML
125 PLASTIC BAG, INJECTION (ML) INTRAVENOUS ONCE AS NEEDED
Status: DISCONTINUED | OUTPATIENT
Start: 2024-06-01 | End: 2024-06-03 | Stop reason: HOSPADM

## 2024-06-01 RX ORDER — ESCITALOPRAM OXALATE 10 MG/1
10 TABLET ORAL DAILY
Status: DISCONTINUED | OUTPATIENT
Start: 2024-06-02 | End: 2024-06-03 | Stop reason: HOSPADM

## 2024-06-01 RX ORDER — BUPIVACAINE HYDROCHLORIDE 2.5 MG/ML
INJECTION, SOLUTION EPIDURAL; INFILTRATION; INTRACAUDAL AS NEEDED
Status: DISCONTINUED | OUTPATIENT
Start: 2024-06-01 | End: 2024-06-01 | Stop reason: SURG

## 2024-06-01 RX ORDER — FENTANYL/ROPIVACAINE/NS/PF 2MCG/ML-.2
PLASTIC BAG, INJECTION (ML) INJECTION
Status: COMPLETED
Start: 2024-06-01 | End: 2024-06-01

## 2024-06-01 RX ORDER — DOCUSATE SODIUM 100 MG/1
100 CAPSULE, LIQUID FILLED ORAL DAILY
Status: DISCONTINUED | OUTPATIENT
Start: 2024-06-01 | End: 2024-06-03 | Stop reason: HOSPADM

## 2024-06-01 RX ORDER — IBUPROFEN 600 MG/1
600 TABLET ORAL EVERY 6 HOURS PRN
Status: DISCONTINUED | OUTPATIENT
Start: 2024-06-01 | End: 2024-06-03 | Stop reason: HOSPADM

## 2024-06-01 RX ORDER — ONDANSETRON 4 MG/1
4 TABLET, ORALLY DISINTEGRATING ORAL EVERY 8 HOURS PRN
Status: DISCONTINUED | OUTPATIENT
Start: 2024-06-01 | End: 2024-06-03 | Stop reason: HOSPADM

## 2024-06-01 RX ORDER — HYDROCODONE BITARTRATE AND ACETAMINOPHEN 10; 325 MG/1; MG/1
1 TABLET ORAL EVERY 4 HOURS PRN
Status: DISCONTINUED | OUTPATIENT
Start: 2024-06-01 | End: 2024-06-03 | Stop reason: HOSPADM

## 2024-06-01 RX ORDER — OXYTOCIN/0.9 % SODIUM CHLORIDE 30/500 ML
1 PLASTIC BAG, INJECTION (ML) INTRAVENOUS
Status: DISCONTINUED | OUTPATIENT
Start: 2024-06-01 | End: 2024-06-01

## 2024-06-01 RX ORDER — HYDROCODONE BITARTRATE AND ACETAMINOPHEN 5; 325 MG/1; MG/1
1 TABLET ORAL EVERY 4 HOURS PRN
Status: DISCONTINUED | OUTPATIENT
Start: 2024-06-01 | End: 2024-06-03 | Stop reason: HOSPADM

## 2024-06-01 RX ORDER — IBUPROFEN 600 MG/1
600 TABLET ORAL EVERY 6 HOURS PRN
Status: DISCONTINUED | OUTPATIENT
Start: 2024-06-01 | End: 2024-06-01 | Stop reason: HOSPADM

## 2024-06-01 RX ORDER — OXYTOCIN/0.9 % SODIUM CHLORIDE 30/500 ML
250 PLASTIC BAG, INJECTION (ML) INTRAVENOUS CONTINUOUS
Status: ACTIVE | OUTPATIENT
Start: 2024-06-01 | End: 2024-06-01

## 2024-06-01 RX ORDER — EPHEDRINE SULFATE 50 MG/ML
5 INJECTION, SOLUTION INTRAVENOUS
Status: DISCONTINUED | OUTPATIENT
Start: 2024-06-01 | End: 2024-06-01 | Stop reason: HOSPADM

## 2024-06-01 RX ORDER — LIDOCAINE HYDROCHLORIDE AND EPINEPHRINE 15; 5 MG/ML; UG/ML
INJECTION, SOLUTION EPIDURAL
Status: COMPLETED | OUTPATIENT
Start: 2024-06-01 | End: 2024-06-01

## 2024-06-01 RX ORDER — FERROUS SULFATE 325(65) MG
325 TABLET ORAL 2 TIMES DAILY WITH MEALS
Status: DISCONTINUED | OUTPATIENT
Start: 2024-06-01 | End: 2024-06-03 | Stop reason: HOSPADM

## 2024-06-01 RX ORDER — FENTANYL CITRATE 50 UG/ML
INJECTION, SOLUTION INTRAMUSCULAR; INTRAVENOUS
Status: COMPLETED
Start: 2024-06-01 | End: 2024-06-01

## 2024-06-01 RX ORDER — ONDANSETRON 2 MG/ML
4 INJECTION INTRAMUSCULAR; INTRAVENOUS EVERY 6 HOURS PRN
Status: DISCONTINUED | OUTPATIENT
Start: 2024-06-01 | End: 2024-06-01 | Stop reason: HOSPADM

## 2024-06-01 RX ADMIN — DOCUSATE SODIUM 100 MG: 100 CAPSULE, LIQUID FILLED ORAL at 17:41

## 2024-06-01 RX ADMIN — SODIUM CHLORIDE, POTASSIUM CHLORIDE, SODIUM LACTATE AND CALCIUM CHLORIDE 1000 ML: 600; 310; 30; 20 INJECTION, SOLUTION INTRAVENOUS at 03:30

## 2024-06-01 RX ADMIN — PENICILLIN G POTASSIUM 5 MILLION UNITS: 5000000 INJECTION, POWDER, FOR SOLUTION INTRAMUSCULAR; INTRAVENOUS at 01:00

## 2024-06-01 RX ADMIN — Medication 1 MILLI-UNITS/MIN: at 10:02

## 2024-06-01 RX ADMIN — PENICILLIN G POTASSIUM 2.5 MILLION UNITS: 5000000 INJECTION, POWDER, FOR SOLUTION INTRAMUSCULAR; INTRAVENOUS at 08:42

## 2024-06-01 RX ADMIN — LIDOCAINE HYDROCHLORIDE AND EPINEPHRINE 3 ML: 15; 5 INJECTION, SOLUTION EPIDURAL at 03:00

## 2024-06-01 RX ADMIN — FERROUS SULFATE TAB 325 MG (65 MG ELEMENTAL FE) 325 MG: 325 (65 FE) TAB at 17:41

## 2024-06-01 RX ADMIN — Medication: at 17:41

## 2024-06-01 RX ADMIN — FENTANYL CITRATE 100 MCG: 50 INJECTION, SOLUTION INTRAMUSCULAR; INTRAVENOUS at 03:06

## 2024-06-01 RX ADMIN — SODIUM CHLORIDE, POTASSIUM CHLORIDE, SODIUM LACTATE AND CALCIUM CHLORIDE 125 ML/HR: 600; 310; 30; 20 INJECTION, SOLUTION INTRAVENOUS at 00:04

## 2024-06-01 RX ADMIN — LIDOCAINE HYDROCHLORIDE AND EPINEPHRINE 2 ML: 15; 5 INJECTION, SOLUTION EPIDURAL at 03:06

## 2024-06-01 RX ADMIN — Medication 10 ML/HR: at 03:08

## 2024-06-01 RX ADMIN — PENICILLIN G POTASSIUM 2.5 MILLION UNITS: 5000000 INJECTION, POWDER, FOR SOLUTION INTRAMUSCULAR; INTRAVENOUS at 04:30

## 2024-06-01 RX ADMIN — BUPIVACAINE HYDROCHLORIDE 3 ML: 2.5 INJECTION, SOLUTION EPIDURAL; INFILTRATION; INTRACAUDAL; PERINEURAL at 03:06

## 2024-06-01 RX ADMIN — WITCH HAZEL: 500 SOLUTION RECTAL; TOPICAL at 17:41

## 2024-06-01 RX ADMIN — Medication 250 ML/HR: at 11:50

## 2024-06-01 RX ADMIN — IBUPROFEN 600 MG: 600 TABLET, FILM COATED ORAL at 20:59

## 2024-06-01 NOTE — ANESTHESIA PROCEDURE NOTES
Labor Epidural      Patient reassessed immediately prior to procedure    Patient location during procedure: OB  Start Time: 6/1/2024 2:54 AM  Stop Time: 6/1/2024 3:10 AM  Performed By  CRNA/CAA: Kyle Olson CRNA  Preanesthetic Checklist  Completed: patient identified, IV checked, risks and benefits discussed, surgical consent, monitors and equipment checked, pre-op evaluation and timeout performed  Additional Notes  No redirection.  Prep:  Pt Position:sitting  Sterile Tech:cap, gloves, mask and sterile barrier  Prep:povidone-iodine 7.5% surgical scrub  Monitoring:blood pressure monitoring, continuous pulse oximetry and EKG  Epidural Block Procedure:  Approach:midline  Guidance:landmark technique  Location:L3-L4  Needle Type:Tuohy  Needle Gauge:17 G  Loss of Resistance Medium: saline  Loss of Resistance: 7cm  Cath Depth at skin:12 cm  Paresthesia: none  Aspiration:negative  Test Dose:negative  Medication: fentaNYL citrate (PF) (SUBLIMAZE) injection - Epidural   100 mcg - 6/1/2024 3:06:00 AM  lidocaine 1.5%-EPINEPHrine 1:200,000 (XYLOCAINE W/EPI) injection - Epidural   3 mL - 6/1/2024 3:00:00 AM   2 mL - 6/1/2024 3:06:00 AM  Number of Attempts: 1  Post Assessment:  Dressing:occlusive dressing applied and secured with tape  Pt Tolerance:patient tolerated the procedure well with no apparent complications  Complications:no

## 2024-06-01 NOTE — NURSING NOTE
27 YO  @ 37 3/7 weeks gestation ambulatory to unit with c/o contractions. Reports mild irregular contractions started after her MD visit today. Reports she was seen and examined at office and reports exam was 3cm dilation per Dr. Grady. Reports contractions have worsened in intensity and are occurring every 6 to 8 minutes. Denies any abnormalties during pregnancy. Only Medical history is depression, anxiety, and kidney stones. Reports good fetal movement. Denies vaginal bleeding or leaking of fluid. Denies h/a, blurred vision or epigastric pain. Urine sample obtained and ran on POC. Placed on [facility].

## 2024-06-01 NOTE — PROGRESS NOTES
"SHIRA Nielsen  Obstetric Progress Note    Subjective     Patient:    The patient feels comfortable after epidural.      Objective   Report received, chart reviewed, patient seen and evaluated  Vital Signs Range for the last 24 hours  Temp:  [97.9 °F (36.6 °C)-98.6 °F (37 °C)] 98.6 °F (37 °C)   Temp src: Oral   BP: ()/(40-89) 102/58   Heart Rate:  [] 83   Resp:  [16-18] 18   SpO2:  [98 %-100 %] 98 %       Device (Oxygen Therapy): room air   Weight:  [78.5 kg (173 lb)] 78.5 kg (173 lb)       Flowsheet Rows      Flowsheet Row First Filed Value   Admission Height 162.6 cm (64\") Documented at 06/01/2024 0109   Admission Weight 78.5 kg (173 lb) Documented at 06/01/2024 0109            Intake/Output last 24 hours:    No intake or output data in the 24 hours ending 06/01/24 0914    Intake/Output this shift:    No intake/output data recorded.    Physical Exam:  General: Patient is in no acute distress   Heart CVS exam: normal rate, regular rhythm, normal S1, S2, no murmurs, rubs, clicks or gallops.   Lungs Chest: clear to auscultation, no wheezes, rales or rhonchi, symmetric air entry.     Abdomen Abdominal exam: Gravid; irregular contractions,   Extremities Exam of extremities: peripheral pulses normal, no pedal edema, no clubbing or cyanosis     Presentation: Vertex   Cervix: Exam by: Method: sterile exam per physician   Dilation: Cervical Dilation (cm): 7   Effacement: Cervical Effacement: 80%   Station:           Fetal Heart Rate Assessment   Method: Fetal HR Assessment Method: external   Beats/min: Fetal HR (beats/min): 145   Baseline: Fetal HR Baseline: normal range   Variability: Fetal HR Variability: moderate (amplitude range 6 to 25 bpm)   Accels: Fetal HR Accelerations: greater than/equal to 15 bpm, lasting at least 15 seconds   Decels: Fetal HR Decelerations: absent   Tracing Category:       Uterine Assessment   Method: Method: palpation, per patient report, external tocotransducer   Frequency (min): " Contraction Frequency (Minutes): 2-4   Ctx Count in 10 min:     Duration:     Intensity: Contraction Intensity: strong by palpation   Intensity by IUPC:     Resting Tone: Uterine Resting Tone: soft by palpation   Resting Tone by IUPC:     Paterson Units:         Assessment & Plan       Uterine contractions        Assessment:  1.  Intrauterine pregnancy at 37w5d gestation with reactive fetal status.    2.  labor  AROM, clear  3.  Obstetrical history significant for   x 4 .  4.  GBS status:   Group B Strep, DNA   Date Value Ref Range Status   2024 Positive (A) Negative Final       Plan:  1. Vaginal anticipated  2. Plan of care has been reviewed with patient and support persons at bedside  3.  Risks, benefits of treatment plan have been discussed.  4.  All questions have been answered.  5.  Repair for stage II labor      Jessica Maier MD  2024  09:14 EDT

## 2024-06-01 NOTE — LACTATION NOTE
Initial visit with pt, this is baby #5 for pt to breastfeed, she states BEKAH mentioned baby has a tongue tie and he can clip it tomorrow, oral assessment shows tongue tie and upper lip restriction, baby due to feed, awakened for pt, baby latched and had some good suckles with swallows noted, pt did note at one feeding some pinching, encouraged her to discuss tongue clipping with BEKAH tomorrow to help with latching. Discussed attempting to feed baby every 3 hrs, allowing unlimited access to breast with unlimited time on breast. Encouraged her to do awake skin to skin as much as possible. LC discussed normal  feeding behavior during the first few days of breastfeeding. I went over waking techniques and how to keep baby awake at breast. Encouraged pt to call out as needed for LC/staff assistance.

## 2024-06-01 NOTE — PLAN OF CARE
Problem: Delayed Labor Progression (Labor)  Goal: Effective Progression to Delivery  Outcome: Ongoing, Progressing     Problem: Infection (Labor)  Goal: Absence of Infection Signs and Symptoms  Outcome: Ongoing, Progressing     Problem: Labor Pain (Labor)  Goal: Acceptable Pain Control  Outcome: Ongoing, Progressing     Problem: Uterine Tachysystole (Labor)  Goal: Normal Uterine Contraction Pattern  Outcome: Ongoing, Progressing   Goal Outcome Evaluation:

## 2024-06-01 NOTE — OBED NOTES
SHIRA Nielsen  Obstetric History and Physical    Chief Complaint   Patient presents with    Abdominal Cramping     Started an hour or two after DrJesus Manuel Appointment and worsened.     Contractions       Subjective     HPI:    Patient is a 28 y.o. female  currently at 37w4d, who presents with contractions.  Patient was 3/70/-2 in the office earlier today and reports that her contractions began an hour or two following her appointment, and have progressively gotten more frequent and more intense.    Patient reports positive fetal movement, denies vaginal bleeding, and denies leakage of fluid.    Her prenatal care is complicated by anxiety/depression, and a low-lying placenta which ultimately resolved.  Her previous obstetric/gynecological history is noted for  history of  delivery at 35 weeks with her most recent pregnancy .    The following portions of the patients history were reviewed and updated as appropriate:   current medications, allergies, past medical history, past surgical history, past family history, past social history and current problem list.     Prenatal Information:  Prenatal Results       Initial Prenatal Labs       Test Value Reference Range Date Time    Hemoglobin  13.9 g/dL 12.0 - 15.9 23 1011    Hematocrit  40.7 % 34.0 - 46.6 23 1011    Platelets  279 10*3/mm3 140 - 450 23 1011    Rubella IgG  1.93 index Immune >0.99 23 1011    Hepatitis B SAg  Non-Reactive  Non-Reactive 23 1011    Hepatitis C Ab  Non-Reactive  Non-Reactive 23 1011    RPR        T. Pallidum Ab   Non-Reactive  Non-Reactive 23 1011    ABO  A   23 1011    Rh  Positive   23 1011    Antibody Screen  Negative   23 1011    HIV  Non-Reactive  Non-Reactive 23 1011    Urine Culture  No growth   23 0955    Gonorrhea  Not Detected  Not Detected  23 0955    Chlamydia  Not Detected  Not Detected  23 0955    TSH  1.580 uIU/mL 0.270 - 4.200 24 0942     HgB A1c   5.20 % 4.80 - 5.60 11/22/23 1011    Varicella IgG        HgB Electrophoresis         Cystic fibrosis                   Fetal testing        Test Value Reference Range Date Time    NIPT        MSAFP        AFP-4                  2nd and 3rd Trimester       Test Value Reference Range Date Time    Hemoglobin (repeated)  11.6 g/dL 12.0 - 15.9 03/05/24 1002    Hematocrit (repeated)  34.4 % 34.0 - 46.6 03/05/24 1002    Platelets   253 10*3/mm3 140 - 450 03/05/24 1002       279 10*3/mm3 140 - 450 11/22/23 1011    GCT  107 mg/dL 65 - 139 03/05/24 1002    Antibody Screen (repeated)        3rd TM syphilis scrn (repeated)  RPR         3rd TM syphilis scrn (repeated) FTA        GTT Fasting        GTT 1 Hr        GTT 2 Hr        GTT 3 Hr        Group B Strep  Positive  Negative 05/20/24 1102              Other testing        Test Value Reference Range Date Time    Parvo IgG         CMV IgG                   Drug Screening       Test Value Reference Range Date Time    Amphetamine Screen        Barbiturate Screen  Negative  Negative 11/22/23 0955    Benzodiazepine Screen  Negative  Negative 11/22/23 0955    Methadone Screen  Negative  Negative 11/22/23 0955    Phencyclidine Screen        Opiates Screen  Negative  Negative 11/22/23 0955    THC Screen  Negative  Negative 11/22/23 0955    Cocaine Screen  Negative  Negative 11/22/23 0955    Propoxyphene Screen        Buprenorphine Screen        Methamphetamine Screen        Oxycodone Screen  Negative  Negative 11/22/23 0955    Tricyclic Antidepressants Screen                  Legend    ^: Historical                          External Prenatal Results       Pregnancy Outside Results - Transcribed From Office Records - See Scanned Records For Details       Test Value Date Time    ABO  A  11/22/23 1011    Rh  Positive  11/22/23 1011    Antibody Screen  Negative  11/22/23 1011    Varicella IgG       Rubella  1.93 index 11/22/23 1011    Hgb  11.6 g/dL 03/05/24 1002       13.9 g/dL  23 1011    Hct  34.4 % 24 1002       40.7 % 23 1011    Glucose Fasting GTT       Glucose Tolerance Test 1 hour       Glucose Tolerance Test 3 hour       Gonorrhea (discrete)  Not Detected  23 0955    Chlamydia (discrete)  Not Detected  23 0955    RPR       VDRL       Syphilis Antibody       HBsAg  Non-Reactive  23 1011    Herpes Simplex Virus PCR       Herpes Simplex VIrus Culture       HIV  Non-Reactive  23 1011    Hep C RNA Quant PCR       Hep C Antibody  Non-Reactive  23 1011    AFP       Group B Strep  Positive  24 1102    GBS Susceptibility to Clindamycin       GBS Susceptibility to Erythromycin       Fetal Fibronectin       Genetic Testing, Maternal Blood                 Drug Screening       Test Value Date Time    NIPT        Urine Drug Screen       Amphetamine Screen       Barbiturate Screen  Negative  23 09    Benzodiazepine Screen  Negative  23 0955    Methadone Screen  Negative  23 09    Phencyclidine Screen       Opiates Screen  Negative  2355    THC Screen  Negative  23 09    Cocaine Screen       Propoxyphene Screen       Buprenorphine Screen       Methamphetamine Screen       Oxycodone Screen  Negative  23 0955              Legend    ^: Historical                             Past OB History:     OB History    Para Term  AB Living   5 4 3 1 0 4   SAB IAB Ectopic Molar Multiple Live Births   0 0 0 0 0 4      # Outcome Date GA Lbr Teodoro/2nd Weight Sex Type Anes PTL Lv   5 Current            4  20 35w0d  2637 g (5 lb 13 oz) M Vag-Spont EPI Y PRESTON   3 Term 17 40w0d  2948 g (6 lb 8 oz) M Vag-Spont EPI N PRESTON   2 Term 03/10/16 40w0d  3657 g (8 lb 1 oz) M Vag-Spont EPI N PRESTON   1 Term 13 40w0d  2977 g (6 lb 9 oz) M Vag-Spont EPI N PRESTON      Obstetric Comments   MENARCHE 11 YRS OLD REGULAR CYCLES, LASTING 5 DAYS       Past Medical History: Past Medical History:   Diagnosis Date     Anxiety     Depression     Heartburn     Kidney stone     Vitamin D deficiency, unspecified       Past Surgical History Past Surgical History:   Procedure Laterality Date    CYSTOSCOPY BLADDER STONE LITHOTRIPSY        Family History: Family History   Problem Relation Age of Onset    Breast cancer Mother     Thyroid disease Mother     Breast cancer Maternal Grandmother     Diabetes Other         mat aunt mat uncle    Diabetes Other         NOT SPECIFIED    Uterine cancer Neg Hx     Ovarian cancer Neg Hx     Colon cancer Neg Hx     Deep vein thrombosis Neg Hx     Pulmonary embolism Neg Hx       Social History:  reports that she has never smoked. She has never used smokeless tobacco.   reports that she does not currently use alcohol.   reports no history of drug use.        General ROS: Pertinent items are noted in HPI  Home Medications:  famotidine and prenatal vitamin    Allergies:  Allergies   Allergen Reactions    Cephalexin Itching     Patient reports she had itching and doctor at the time reported it may be mild allergy to this med.        Objective       Vital Signs Range for the last 24 hours  Temperature: Temp:  [97.9 °F (36.6 °C)] 97.9 °F (36.6 °C)   Temp Source: Temp src: Axillary   BP: BP: (123-129)/(79-86) 125/86   Pulse: Heart Rate:  [96-98] 98   Respirations: Resp:  [18] 18   SPO2: SpO2:  [100 %] 100 %     Physical Examination:   General appearance - alert, well appearing, and in no distress  Mental status - alert, oriented to person, place, and time  Chest - clear to auscultation  Heart - normal rate, regular rhythm,  Abdomen - soft, nontender, gravid  Pelvic - normal external genitalia, vulva, vagina, cervix, and uterus   Neurological - alert, oriented, normal speech  Extremities - peripheral pulses normal, +1 edema lower extremities bilaterally  Skin - normal coloration and turgor, no suspicious skin lesions noted    Presentation: Cephalic   Cervix: Method: sterile exam per RN   Dilation: Cervical  Dilation (cm): 5-6   Effacement: Cervical Effacement: 90%   Station:  -1       Fetal Heart Rate Assessment   Method: Fetal HR Assessment Method: external   Beats/min: Fetal HR (beats/min): 145   Baseline: Fetal HR Baseline: normal range   Variability: Fetal HR Variability: moderate (amplitude range 6 to 25 bpm)   Accels: Fetal HR Accelerations: episodic, greater than/equal to 15 bpm, lasting at least 15 seconds   Decels: Fetal HR Decelerations: absent   Tracing Category:       Uterine Assessment   Method: Method: palpation, external tocotransducer   Frequency (min): Contraction Frequency (Minutes): 4-5   Ctx Count in 10 min:     Duration:     Intensity: Contraction Intensity: moderate by palpation   Ramsay Units:       GBS is positive     Assessment & Plan       Uterine contractions        Assessment:  1.  Intrauterine pregnancy at 37w4d gestation with reactive, reassuring fetal status.    2.  labor  without ROM  3.  Obstetrical history significant for   delivery at 35 weeks with prior pregnancy .  4.  GBS status:   Group B Strep, DNA   Date Value Ref Range Status   2024 Positive (A) Negative Final   5.  Anxiety/depression    Plan:    FWB:  - Anatomy US: 24 [facility] consistent w dates, posterior placenta, breech, wnl completed  - FU US: [facility] 24  6#10oz 68%, AC 77%, MIN 14, VTX, grade 1 placenta  - Category 1 fetal heart tracing  - Patient planning for epidural  - Will continue to monitor closely  - Anticipate     2.  PNL:  - UTD, repeat RPR pending  - Hemoglobin on admission 11.2    3.  GBS positive:  - Reviewed cephalexin allergy with pharmacy  - Per pharmacist, penicillin is in a completely different class from cephalexin and they feel it would be safe to give penicillin G given that patient's allergy to cephalexin was not angioedema or anaphylaxis or hives  - Order for penicillin G placed    4.  History of  delivery:  - 35 weeks G4  - recommended vaginal progesterone nightly at 16  weeks-36 weeks - patient declined  - cervical length 16-24 weeks - wnl at 21+2, no more needed  - Patient reports onset of spontaneous labor    5.  Anxiety/depression:  - Off Lexapro. Plan restart PP. Using doxylamine PRN anxiety- continue. Stable     6.  Fatigue in pregnancy:  - normal TSH, low free T4, normal total T4. Recheck free T4 postpartum     7.  Low-lying placenta:  - 16 week US, FU US resolved         Plan of care has been reviewed with patient and patient agrees.   Risks, benefits of treatment plan have been discussed.  All questions have been answered.        Electronically signed by Frances Betancourt MD, 05/31/24, 11:33 PM EDT.

## 2024-06-01 NOTE — ANESTHESIA PREPROCEDURE EVALUATION
Anesthesia Evaluation     no history of anesthetic complications:   NPO Solid Status: > 6 hours  NPO Liquid Status: > 2 hours           Airway   Mallampati: II  TM distance: >3 FB  Neck ROM: full  Dental - normal exam     Pulmonary - normal exam   Cardiovascular - normal exam  Exercise tolerance: good (4-7 METS)        Neuro/Psych  GI/Hepatic/Renal/Endo      Musculoskeletal (-) negative ROS    Abdominal    Substance History - negative use     OB/GYN    (+) Pregnant        Other                    Anesthesia Plan    ASA 2     epidural       Anesthetic plan, risks, benefits, and alternatives have been provided, discussed and informed consent has been obtained with: patient.    CODE STATUS:

## 2024-06-01 NOTE — L&D DELIVERY NOTE
[facility]   Vaginal Delivery Note    Patient Name: Lillian Kebede  : 1995  MRN: [id number]    Date of Delivery: 2024     Diagnosis     Pre & Post-Delivery:  Intrauterine pregnancy at 37w5d  Labor status:      Uterine contractions             Problem List    Transfer to Postpartum     Review the Delivery Report for details.     Delivery     Delivery:      YOB: 2025    Time of Birth:  Gestational Age 11:13 AM   37w5d     Anesthesia:      Delivering clinician:     Forceps?   No   Vacuum? No    Shoulder dystocia present: No        Delivery narrative: Presented and labor at 37 weeks 5 days.  Received epidural analgesia and artificial rupture of membranes for clear fluids.  She did not have any cervical change for approximately an hour and a half, at which time low-dose Pitocin was initiated.  She had a maximum of 4 milliunits/min.  She progressed to have a spontaneous vaginal delivery of a liveborn female infant.  Nuchal cord x 1 reduced at the perineum.  The infant was delivered in a controlled fashion.  She was bulb suction at the perineum and then laid on the mother's abdomen where she was attended to by the nursery nurse.  After approximately 1 minute the umbilical cord was clamped in 2 places.  The father then cut the umbilical cord.  Samples of blood were collected from the umbilical cord for arterial and venous cord blood sampling; as well as for Aiyana testing.  There was spontaneous delivery of intact placenta with three-vessel cord and trailing membranes.  After inspection the placenta was discarded.  The uterus was massaged and the cavity explored with sponge on a stick x 2.  The birth canal was inspected.  A few tiny abrasions noted.  TOMMY intact.   mL.  Mom and infant are in satisfactory condition.  Father and aunt at bedside.      Infant     Findings: female  infant     Infant observations: Weight: 3460 g (7 lb 10.1 oz)   Length: 18  in  Observations/Comments:         Apgars: 8  @ 1 minute /    9  @ 5 minutes   Infant Name: Marjan Chawla     Placenta & Cord         Placenta delivered    at        Cord:   present.   Nuchal Cord?  yes; Number of nuchal loops present:      Cord blood obtained:     Cord gases obtained:      Cord gas results: Venous:    pH, Cord Venous   Date Value Ref Range Status   06/01/2024 7.426 (H) 7.310 - 7.370 pH Units Final     Base Excess, Cord Venous   Date Value Ref Range Status   06/01/2024 -1.8 -2.0 - 2.0 mmol/L Final       Arterial:    pH, Cord Arterial   Date Value Ref Range Status   06/01/2024 7.24 7.21 - 7.31 pH Units Final     Base Exc, Cord Arterial   Date Value Ref Range Status   06/01/2024 -4.7 (L) -2.0 - 2.0 mmol/L Final        Repair     Episiotomy: Not recorded     No    Lacerations: No   Estimated Blood Loss:       Quantitative Blood Loss:          Complications     none    Disposition     Mother to Remain in LD  in stable condition currently.  Baby to remains with mom  in stable condition currently.    Jessica Maier MD  06/01/24  11:46 EDT

## 2024-06-01 NOTE — PLAN OF CARE
Problem: Adult Inpatient Plan of Care  Goal: Plan of Care Review  Outcome: Ongoing, Progressing  Goal: Patient-Specific Goal (Individualized)  Outcome: Ongoing, Progressing  Goal: Absence of Hospital-Acquired Illness or Injury  Outcome: Ongoing, Progressing  Goal: Optimal Comfort and Wellbeing  Outcome: Ongoing, Progressing  Goal: Readiness for Transition of Care  Outcome: Ongoing, Progressing  Intervention: Mutually Develop Transition Plan  Recent Flowsheet Documentation  Taken 6/1/2024 0116 by Eneida Estrella RN  Equipment Currently Used at Home: none  Taken 6/1/2024 0112 by Eneida Estrella, RN  Transportation Anticipated:   car, drives self   family or friend will provide  Patient/Family Anticipated Services at Transition: none  Patient/Family Anticipates Transition to: home     Problem: Bleeding (Labor)  Goal: Hemostasis  Outcome: Ongoing, Progressing     Problem: Change in Fetal Wellbeing (Labor)  Goal: Stable Fetal Wellbeing  Outcome: Ongoing, Progressing     Problem: Delayed Labor Progression (Labor)  Goal: Effective Progression to Delivery  Outcome: Ongoing, Progressing     Problem: Infection (Labor)  Goal: Absence of Infection Signs and Symptoms  Outcome: Ongoing, Progressing     Problem: Labor Pain (Labor)  Goal: Acceptable Pain Control  Outcome: Ongoing, Progressing     Problem: Uterine Tachysystole (Labor)  Goal: Normal Uterine Contraction Pattern  Outcome: Ongoing, Progressing   Goal Outcome Evaluation:

## 2024-06-02 PROCEDURE — 84436 ASSAY OF TOTAL THYROXINE: CPT | Performed by: OBSTETRICS & GYNECOLOGY

## 2024-06-02 PROCEDURE — 84443 ASSAY THYROID STIM HORMONE: CPT | Performed by: OBSTETRICS & GYNECOLOGY

## 2024-06-02 PROCEDURE — 84480 ASSAY TRIIODOTHYRONINE (T3): CPT | Performed by: OBSTETRICS & GYNECOLOGY

## 2024-06-02 PROCEDURE — 84479 ASSAY OF THYROID (T3 OR T4): CPT | Performed by: OBSTETRICS & GYNECOLOGY

## 2024-06-02 RX ADMIN — IBUPROFEN 600 MG: 600 TABLET, FILM COATED ORAL at 04:01

## 2024-06-02 RX ADMIN — FERROUS SULFATE TAB 325 MG (65 MG ELEMENTAL FE) 325 MG: 325 (65 FE) TAB at 08:38

## 2024-06-02 RX ADMIN — ACETAMINOPHEN 650 MG: 325 TABLET ORAL at 08:45

## 2024-06-02 RX ADMIN — ESCITALOPRAM OXALATE 10 MG: 10 TABLET ORAL at 08:38

## 2024-06-02 RX ADMIN — FERROUS SULFATE TAB 325 MG (65 MG ELEMENTAL FE) 325 MG: 325 (65 FE) TAB at 20:00

## 2024-06-02 RX ADMIN — IBUPROFEN 600 MG: 600 TABLET, FILM COATED ORAL at 21:17

## 2024-06-02 RX ADMIN — DOCUSATE SODIUM 100 MG: 100 CAPSULE, LIQUID FILLED ORAL at 08:38

## 2024-06-02 NOTE — PROGRESS NOTES
" Nielsen   Vaginal Delivery Progress Note    Subjective   Postpartum Day 1: Vaginal Delivery    The patient feels well.  Her pain is well controlled with nonsteroidal anti-inflammatory drugs.   She is ambulating well.  Patient describes her bleeding as moderate lochia.    Breastfeeding: infant latching.    Objective     Vital Signs Range for the last 24 hours  Temperature: Temp:  [97.5 °F (36.4 °C)-98.6 °F (37 °C)] 97.5 °F (36.4 °C)   Temp Source: Temp src: Oral   BP: BP: ()/(42-78) 116/71   Pulse: Heart Rate:  [] 63   Respirations: Resp:  [16-20] 16   SPO2:     O2 Amount (l/min):     O2 Devices Device (Oxygen Therapy): room air   Weight:       Admit Height:  Height: 162.6 cm (64\")      Physical Exam:  General:  no acute distress.  Abdomen: Fundus: appropriate, firm, non tender  Extremities: normal, atraumatic, no cyanosis, and trace edema.       Lab results reviewed:  Yes   Rubella:  No results found for: \"RUBELLAIGGIN\" Nurse Transcribed from prenatal record --    Rubella Antibodies, IgG   Date Value Ref Range Status   11/22/2023 1.93 Immune >0.99 index Final     Comment:                                     Non-immune       <0.90                                  Equivocal  0.90 - 0.99                                  Immune           >0.99     Rh Status:    RH type   Date Value Ref Range Status   06/01/2024 Positive  Final     Immunizations:   Immunization History   Administered Date(s) Administered    Fluzone Quad >6mos (Multi-dose) 10/12/2018    Influenza Injectable Mdck Pf Quad 10/12/2018    Influenza Quad Vaccine (Inpatient) 10/12/2018    PPD - Urgent Care Use Only 10/22/2014, 10/15/2018       Assessment & Plan       Postpartum care and examination immediately after delivery      Vaginal, Spontaneous   .      Plan:  Continue current care.      Jessica Maier MD  6/2/2024  07:42 EDT  "

## 2024-06-02 NOTE — LACTATION NOTE
Pt states baby fed well during the night, has no concerns, hopes to go home today. Home breast pump given. Pt is to call out as needed.

## 2024-06-02 NOTE — ANESTHESIA POSTPROCEDURE EVALUATION
Patient: Lillian Kebede    Procedure Summary       Date: 06/01/24 Room / Location:     Anesthesia Start: 0252 Anesthesia Stop: 1113    Procedure: LABOR ANALGESIA Diagnosis:     Scheduled Providers:  Provider: Nani Ayon CRNA    Anesthesia Type: epidural ASA Status: 2            Anesthesia Type: epidural    Vitals  Vitals Value Taken Time   /63 06/02/24 0851   Temp 36.5 °C (97.7 °F) 06/02/24 0851   Pulse 75 06/02/24 0851   Resp 17 06/02/24 0851   SpO2 98 % 06/01/24 1109   Vitals shown include unfiled device data.        Post Anesthesia Care and Evaluation    Patient location during evaluation: bedside  Patient participation: complete - patient participated  Level of consciousness: awake  Pain management: adequate    Airway patency: patent  Anesthetic complications: No anesthetic complications  PONV Status: controlled  Cardiovascular status: acceptable and stable  Respiratory status: acceptable  Hydration status: acceptable  Post Neuraxial Block status: Motor and sensory function returned to baseline and No signs or symptoms of PDPH  Comments: No complaints or concerns.

## 2024-06-03 VITALS
BODY MASS INDEX: 29.53 KG/M2 | TEMPERATURE: 98.6 F | DIASTOLIC BLOOD PRESSURE: 65 MMHG | HEART RATE: 65 BPM | OXYGEN SATURATION: 98 % | SYSTOLIC BLOOD PRESSURE: 105 MMHG | WEIGHT: 173 LBS | HEIGHT: 64 IN | RESPIRATION RATE: 17 BRPM

## 2024-06-03 RX ORDER — ACETAMINOPHEN 325 MG/1
650 TABLET ORAL EVERY 6 HOURS PRN
Qty: 20 TABLET | Refills: 0 | Status: SHIPPED | OUTPATIENT
Start: 2024-06-03

## 2024-06-03 RX ORDER — IBUPROFEN 600 MG/1
600 TABLET ORAL EVERY 6 HOURS PRN
Qty: 30 TABLET | Refills: 0 | Status: SHIPPED | OUTPATIENT
Start: 2024-06-03

## 2024-06-03 RX ADMIN — FERROUS SULFATE TAB 325 MG (65 MG ELEMENTAL FE) 325 MG: 325 (65 FE) TAB at 17:08

## 2024-06-03 RX ADMIN — IBUPROFEN 600 MG: 600 TABLET, FILM COATED ORAL at 17:11

## 2024-06-03 RX ADMIN — DOCUSATE SODIUM 100 MG: 100 CAPSULE, LIQUID FILLED ORAL at 08:37

## 2024-06-03 RX ADMIN — FERROUS SULFATE TAB 325 MG (65 MG ELEMENTAL FE) 325 MG: 325 (65 FE) TAB at 08:37

## 2024-06-03 RX ADMIN — ESCITALOPRAM OXALATE 10 MG: 10 TABLET ORAL at 08:37

## 2024-06-03 NOTE — PLAN OF CARE
Problem: Adult Inpatient Plan of Care  Goal: Plan of Care Review  Outcome: Met  Goal: Patient-Specific Goal (Individualized)  Outcome: Met  Goal: Absence of Hospital-Acquired Illness or Injury  Outcome: Met  Intervention: Identify and Manage Fall Risk  Intervention: Prevent and Manage VTE (Venous Thromboembolism) Risk  Intervention: Prevent Infection  Goal: Optimal Comfort and Wellbeing  Outcome: Met  Goal: Readiness for Transition of Care  Outcome: Met  Intervention: Mutually Develop Transition Plan     Problem: Bleeding (Labor)  Goal: Hemostasis  Outcome: Met     Problem: Change in Fetal Wellbeing (Labor)  Goal: Stable Fetal Wellbeing  Outcome: Met     Problem: Delayed Labor Progression (Labor)  Goal: Effective Progression to Delivery  Outcome: Met     Problem: Infection (Labor)  Goal: Absence of Infection Signs and Symptoms  Outcome: Met  Intervention: Prevent or Manage Infection     Problem: Labor Pain (Labor)  Goal: Acceptable Pain Control  Outcome: Met     Problem: Uterine Tachysystole (Labor)  Goal: Normal Uterine Contraction Pattern  Outcome: Met     Problem: Adjustment to Role Transition (Postpartum Vaginal Delivery)  Goal: Successful Maternal Role Transition  Outcome: Met     Problem: Bleeding (Postpartum Vaginal Delivery)  Goal: Hemostasis  Outcome: Met     Problem: Infection (Postpartum Vaginal Delivery)  Goal: Absence of Infection Signs/Symptoms  Outcome: Met     Problem: Pain (Postpartum Vaginal Delivery)  Goal: Acceptable Pain Control  Outcome: Met     Problem: Urinary Retention (Postpartum Vaginal Delivery)  Goal: Effective Urinary Elimination  Outcome: Met     Problem: Breastfeeding  Goal: Effective Breastfeeding  Outcome: Met  Intervention: Promote Breast Care and Comfort  Intervention: Support Exclusive Breastfeeding Success  Intervention: Promote Effective Breastfeeding   Goal Outcome Evaluation:         Pt is up ad diego caring for herself and infant. Assessment is wnl. Vss, pain and lochia  controlled. Postpartum appt made. Ready to dc home with infant.

## 2024-06-03 NOTE — LACTATION NOTE
Pt with baby latched to right breast, swallows noted. D/C instructions gone over, included hand hygiene, respiratory hygiene and breastfeeding when mom is sick, LC encouraged pt to see pediatrician within two days of discharge for follow up. LC discussed  breastfeeding behaviors, first two weeks of breastfeeding expectations, encouraged her to breastfeed/pump frequently for good milk supply. LC discussed nipple care, plugged ducts, engorgement, and breast infection. LC informed pt that LC was available after D/C for assistance with breastfeeding.

## 2024-06-03 NOTE — DISCHARGE SUMMARY
" [facility]  Delivery Discharge Summary    Primary OB Clinician:     EDC: Estimated Date of Delivery: 24    Admitting Diagnosis:  Uterine contractions [O47.9]    Discharge Diagnosis:  Same as Admitting plus:   Pregnancy at 37w5d - Delivered     Antepartum complications: anemia    Date of Delivery: 2024   Time of Delivery: 11:13 AM     Delivered By:  Jessica Maier     Delivery Type: Vaginal, Spontaneous      Tubal Ligation: n/a    Baby:female  infant;   Apgar:  8  @ 1 minute /   Apgar:  9  @ 5 minutes   Weight: 3460 g (7 lb 10.1 oz)    Length: 18     Anesthesia: Epidural      Intrapartum complications: None    Laceration: No    Episiotomy: No    Placenta: Spontaneous     Feeding method: Breastfeeding Status: Yes    Rh Immune globulin given: not applicable    Rubella vaccine given: not applicable    aginal Delivery Progress Note    Subjective   Subjective  Postpartum Day 1: Vaginal Delivery    The patient feels well and would like to be discharged.  Her pain is well controlled with prescribed pain medications.   She is ambulating well.  Patient describes her bleeding as moderate lochia.    Breastfeeding: infant latching.    Objective     Objective (late entry; patient evaluated at 8:45 AM)  Vital Signs Range for the last 24 hours  Temperature: Temp:  [98.1 °F (36.7 °C)-98.6 °F (37 °C)] 98.6 °F (37 °C)   Temp Source: Temp src: Oral   BP: BP: (105-122)/(62-67) 105/65   Pulse: Heart Rate:  [57-71] 65   Respirations: Resp:  [17-20] 17   SPO2:     O2 Amount (l/min):     O2 Devices     Weight:       Admit Height:  Height: 162.6 cm (64\")    Physical Exam:  General:  no acute distresss.  Abdomen: Fundus: appropriate, firm, non tender  Extremities: normal, atraumatic, no cyanosis, and trace edema    Hospital course: Patient admitted in labor.  Received IV antibiotics for GBS prophylaxis.  She had a spontaneous vaginal delivery over an intact perineum without complication.  She has met the expected milestones for " discharge.  She is discharged home in satisfactory condition.  She should maintain pelvic rest.  Call with evidence of infection, excessive bleeding or worsening of depression.        Plan:      Follow-up appointment with Dr. TYSHAWN Grady    Electronically signed by Jessica Maier MD, 06/03/24, 6:59 PM EDT.

## 2024-06-03 NOTE — PLAN OF CARE
Problem: Adult Inpatient Plan of Care  Goal: Plan of Care Review  Outcome: Ongoing, Progressing  Goal: Patient-Specific Goal (Individualized)  Outcome: Ongoing, Progressing  Goal: Absence of Hospital-Acquired Illness or Injury  Outcome: Ongoing, Progressing  Intervention: Identify and Manage Fall Risk  Recent Flowsheet Documentation  Taken 6/3/2024 0420 by Keesha French RN  Safety Promotion/Fall Prevention: safety round/check completed  Taken 6/3/2024 0315 by Keesha French RN  Safety Promotion/Fall Prevention: safety round/check completed  Taken 6/3/2024 0020 by Keesha French RN  Safety Promotion/Fall Prevention: safety round/check completed  Taken 6/2/2024 2350 by Keesha French RN  Safety Promotion/Fall Prevention: safety round/check completed  Taken 6/2/2024 2307 by Keesha French RN  Safety Promotion/Fall Prevention: safety round/check completed  Goal: Optimal Comfort and Wellbeing  Outcome: Ongoing, Progressing  Goal: Readiness for Transition of Care  Outcome: Ongoing, Progressing     Problem: Bleeding (Labor)  Goal: Hemostasis  Outcome: Ongoing, Progressing     Problem: Change in Fetal Wellbeing (Labor)  Goal: Stable Fetal Wellbeing  Outcome: Ongoing, Progressing     Problem: Delayed Labor Progression (Labor)  Goal: Effective Progression to Delivery  Outcome: Ongoing, Progressing     Problem: Infection (Labor)  Goal: Absence of Infection Signs and Symptoms  Outcome: Ongoing, Progressing     Problem: Labor Pain (Labor)  Goal: Acceptable Pain Control  Outcome: Ongoing, Progressing     Problem: Uterine Tachysystole (Labor)  Goal: Normal Uterine Contraction Pattern  Outcome: Ongoing, Progressing     Problem: Adjustment to Role Transition (Postpartum Vaginal Delivery)  Goal: Successful Maternal Role Transition  Outcome: Ongoing, Progressing     Problem: Bleeding (Postpartum Vaginal Delivery)  Goal: Hemostasis  Outcome: Ongoing, Progressing     Problem: Infection (Postpartum Vaginal Delivery)  Goal:  Absence of Infection Signs/Symptoms  Outcome: Ongoing, Progressing     Problem: Pain (Postpartum Vaginal Delivery)  Goal: Acceptable Pain Control  Outcome: Ongoing, Progressing     Problem: Urinary Retention (Postpartum Vaginal Delivery)  Goal: Effective Urinary Elimination  Outcome: Ongoing, Progressing     Problem: Breastfeeding  Goal: Effective Breastfeeding  Outcome: Ongoing, Progressing   Goal Outcome Evaluation:

## 2024-06-04 LAB
FT4I SERPL CALC-MCNC: 1.5 (ref 1.2–4.9)
T3 SERPL-MCNC: 132 NG/DL (ref 71–180)
T3RU NFR SERPL: 17 % (ref 24–39)
T4 SERPL-MCNC: 8.9 UG/DL (ref 4.5–12)
TSH SERPL DL<=0.005 MIU/L-ACNC: 1.79 UIU/ML (ref 0.45–4.5)

## 2024-06-12 ENCOUNTER — MATERNAL SCREENING (OUTPATIENT)
Dept: CALL CENTER | Facility: HOSPITAL | Age: 29
End: 2024-06-12
Payer: COMMERCIAL

## 2024-06-12 NOTE — OUTREACH NOTE
Maternal Screening Survey      Flowsheet Row Responses   Facility patient discharged from? [facility]   Attempt successful? No   Unsuccessful attempts Attempt 1  [left msg on vm]              CESILIA REILLY - Registered Nurse

## 2024-06-12 NOTE — OUTREACH NOTE
Maternal Screening Survey      Flowsheet Row Responses   Facility patient discharged from? [facility]   Attempt successful? No   Unsuccessful attempts Attempt 2              CESILIA REILLY - Registered Nurse

## 2024-06-13 ENCOUNTER — MATERNAL SCREENING (OUTPATIENT)
Dept: CALL CENTER | Facility: HOSPITAL | Age: 29
End: 2024-06-13
Payer: COMMERCIAL

## 2024-06-13 NOTE — OUTREACH NOTE
Maternal Screening Survey      Flowsheet Row Responses   Facility patient discharged from? [facility]   Attempt successful? No   Unsuccessful attempts Attempt 3   Revoke Decline to participate              CESILIA REILLY - Registered Nurse

## 2024-07-09 ENCOUNTER — TELEPHONE (OUTPATIENT)
Dept: OBSTETRICS AND GYNECOLOGY | Facility: CLINIC | Age: 29
End: 2024-07-09
Payer: COMMERCIAL

## 2024-07-12 ENCOUNTER — TELEPHONE (OUTPATIENT)
Dept: OBSTETRICS AND GYNECOLOGY | Facility: CLINIC | Age: 29
End: 2024-07-12
Payer: COMMERCIAL

## 2024-07-12 NOTE — TELEPHONE ENCOUNTER
Caller: Lillian Kebede    Relationship to patient: Self    Best call back number: [phone]    Chief complaint: NEEDS A 6WK POSTPARTUM THE DELIVERING PROVIDER WAS DR. HOPE - NEEDS POSTPARTUM W/DR. WHITFIELD    Type of visit: 6WK F/U DEVLIERED 6-1    Requested date: ANY     If rescheduling, when is the original appointment: NONE     Additional notes:DID NOT FIND ANY IN TIMEFRAME NA AT OFFICE

## 2024-07-15 PROBLEM — Z34.80 SUPERVISION OF OTHER NORMAL PREGNANCY, ANTEPARTUM: Status: RESOLVED | Noted: 2023-11-22 | Resolved: 2024-07-15

## 2024-07-17 ENCOUNTER — TELEPHONE (OUTPATIENT)
Dept: OBSTETRICS AND GYNECOLOGY | Facility: CLINIC | Age: 29
End: 2024-07-17
Payer: COMMERCIAL

## 2024-07-29 NOTE — PROGRESS NOTES
"POSTPARTUM Follow Up Visit      Chief Complaint   Patient presents with    Postpartum Care     HPI:    Date of delivery: 2024  Delivery type:            Perineum : Intact  Delivering Provider:   Dr. Jessica Maier        Feeding: Breast  Pain:  no  Vaginal Bleeding:  no  Plans for BC:  None    Depressed/Anxious:  yes history of anxiety and depression, was using doxylamine as needed during pregnancy.  Using lexapro and doing well.   EPDS score: 6  #10: 0    Weight at last OB OV:  173lbs   Last pap date and result: Liquid Based Pap Smear-[facility] (2021 12:00) Pap only negative    Discharge Summary by Jessica Maier MD (2024 18:59)    Was having fatigue during her pregnancy and free T4 was mildly low.    PHYSICAL EXAM:  /82   Pulse 78   Ht 162.6 cm (64.02\")   Wt 68 kg (150 lb)   Breastfeeding Yes   BMI 25.73 kg/m²  Not found.  General- NAD, alert and oriented, appropriate  Psych- Normal mood, good memory, good eye contact    Abdomen- Soft, non distended, non tender, no masses     Chaperone present during pelvic exam.  External genitalia- Normal.    Urethra/Bladder/Vagina- Normal, no masses, non-tender  Prolapse : none noted, not examined with split speculum to delineate   Cervix- Normal, no lesions, no discharge, no CMT  Uterus- Normal size, shape & consistency.  Non tender, mobile.  Adnexa- Normal, no mass, non-tender    Lymphatic- No palpable groin nodes  Extremities- No edema    ASSESSMENT AND PLAN:  Diagnoses and all orders for this visit:    1. Postpartum follow-up (Primary)    2. Birth control counseling  Comments:  declines    3. Abnormal serum thyroxine (T4) level  Overview:  Recheck Free T4 PP    Orders:  -     T4, Free    4. Anxiety and depression  Comments:  cont lexapro 20mg      Counseling:    Ok to resume intercourse  May resume normal activities  Core strengthening exercises reviewed and recommended  Kegel exercises reviewed and recommended  Ok to return to " work/school once patient desires/maternity leave completed        Follow Up:  Return in about 3 months (around 11/5/2024) for WWE.          Rosa Grady,   08/05/2024    [facility]  [address] [place] [address]  Dept: [phone]  Dept Fax: [phone]  Loc: [phone]  Loc Fax: [phone]

## 2024-08-05 ENCOUNTER — POSTPARTUM VISIT (OUTPATIENT)
Dept: OBSTETRICS AND GYNECOLOGY | Facility: CLINIC | Age: 29
End: 2024-08-05
Payer: COMMERCIAL

## 2024-08-05 VITALS
HEIGHT: 64 IN | HEART RATE: 78 BPM | SYSTOLIC BLOOD PRESSURE: 125 MMHG | DIASTOLIC BLOOD PRESSURE: 82 MMHG | WEIGHT: 150 LBS | BODY MASS INDEX: 25.61 KG/M2

## 2024-08-05 DIAGNOSIS — R79.89 ABNORMAL SERUM THYROXINE (T4) LEVEL: ICD-10-CM

## 2024-08-05 DIAGNOSIS — F32.A ANXIETY AND DEPRESSION: ICD-10-CM

## 2024-08-05 DIAGNOSIS — F41.9 ANXIETY AND DEPRESSION: ICD-10-CM

## 2024-08-05 DIAGNOSIS — Z30.09 BIRTH CONTROL COUNSELING: ICD-10-CM

## 2024-08-05 PROCEDURE — 0503F POSTPARTUM CARE VISIT: CPT | Performed by: OBSTETRICS & GYNECOLOGY

## 2024-10-14 ENCOUNTER — TELEPHONE (OUTPATIENT)
Dept: OBSTETRICS AND GYNECOLOGY | Facility: CLINIC | Age: 29
End: 2024-10-14
Payer: COMMERCIAL

## 2024-10-18 NOTE — TELEPHONE ENCOUNTER
Summary: RE: 11/12/24 annual (Need to R/s)   10/14/24 lmvm for pt to return callt o r/s from 11/12/24 call sched [email or web address]